# Patient Record
Sex: MALE | Race: BLACK OR AFRICAN AMERICAN | NOT HISPANIC OR LATINO | Employment: FULL TIME | URBAN - METROPOLITAN AREA
[De-identification: names, ages, dates, MRNs, and addresses within clinical notes are randomized per-mention and may not be internally consistent; named-entity substitution may affect disease eponyms.]

---

## 2021-01-19 ENCOUNTER — HOSPITAL ENCOUNTER (EMERGENCY)
Facility: HOSPITAL | Age: 56
Discharge: HOME | End: 2021-01-19
Attending: EMERGENCY MEDICINE
Payer: MEDICAID

## 2021-01-19 VITALS
DIASTOLIC BLOOD PRESSURE: 67 MMHG | OXYGEN SATURATION: 98 % | TEMPERATURE: 99 F | HEIGHT: 71 IN | SYSTOLIC BLOOD PRESSURE: 135 MMHG | BODY MASS INDEX: 33.6 KG/M2 | RESPIRATION RATE: 16 BRPM | WEIGHT: 240 LBS | HEART RATE: 71 BPM

## 2021-01-19 DIAGNOSIS — M25.461 KNEE EFFUSION, RIGHT: ICD-10-CM

## 2021-01-19 DIAGNOSIS — M25.561 RIGHT KNEE PAIN, UNSPECIFIED CHRONICITY: Primary | ICD-10-CM

## 2021-01-19 LAB
ALBUMIN SERPL-MCNC: 3.5 G/DL (ref 3.3–5.5)
ALP SERPL-CCNC: 83 U/L (ref 42–141)
APPEARANCE FLD: NORMAL
BILIRUB SERPL-MCNC: 0.9 MG/DL (ref 0.2–1.6)
BODY FLD TYPE: NORMAL
BUN SERPL-MCNC: 17 MG/DL (ref 7–22)
CALCIUM SERPL-MCNC: 9.4 MG/DL (ref 8–10.3)
CHLORIDE SERPL-SCNC: 106 MMOL/L (ref 98–108)
COLOR FLD: YELLOW
CREAT SERPL-MCNC: 1.3 MG/DL (ref 0.6–1.2)
GLUCOSE SERPL-MCNC: 107 MG/DL (ref 73–118)
LYMPHOCYTES NFR FLD MANUAL: 88 %
NEUTROPHILS NFR FLD MANUAL: 12 %
POC ALT (SGPT): 14 U/L (ref 10–47)
POC AST (SGOT): 20 U/L (ref 11–38)
POC PTINR: 1.2 (ref 0.9–1.2)
POC PTWBT: 13.9 SEC (ref 9.7–14.3)
POC TCO2: 29 MMOL/L (ref 18–33)
POTASSIUM BLD-SCNC: 4.7 MMOL/L (ref 3.6–5.1)
PROTEIN, POC: 7.4 G/DL (ref 6.4–8.1)
SAMPLE: NORMAL
SODIUM BLD-SCNC: 142 MMOL/L (ref 128–145)
WBC # FLD: 725 /CU MM

## 2021-01-19 PROCEDURE — 87205 SMEAR GRAM STAIN: CPT

## 2021-01-19 PROCEDURE — 25000003 PHARM REV CODE 250: Mod: ER | Performed by: EMERGENCY MEDICINE

## 2021-01-19 PROCEDURE — 99284 EMERGENCY DEPT VISIT MOD MDM: CPT | Mod: 25,ER

## 2021-01-19 PROCEDURE — 89051 BODY FLUID CELL COUNT: CPT

## 2021-01-19 PROCEDURE — 89060 EXAM SYNOVIAL FLUID CRYSTALS: CPT

## 2021-01-19 PROCEDURE — 80053 COMPREHEN METABOLIC PANEL: CPT | Mod: ER

## 2021-01-19 PROCEDURE — 85025 COMPLETE CBC W/AUTO DIFF WBC: CPT | Mod: ER

## 2021-01-19 PROCEDURE — 85610 PROTHROMBIN TIME: CPT | Mod: ER

## 2021-01-19 PROCEDURE — 87070 CULTURE OTHR SPECIMN AEROBIC: CPT

## 2021-01-19 PROCEDURE — 20610 DRAIN/INJ JOINT/BURSA W/O US: CPT | Mod: RT,ER

## 2021-01-19 RX ORDER — OXYCODONE AND ACETAMINOPHEN 5; 325 MG/1; MG/1
2 TABLET ORAL
Status: COMPLETED | OUTPATIENT
Start: 2021-01-19 | End: 2021-01-19

## 2021-01-19 RX ORDER — NAPROXEN 500 MG/1
500 TABLET ORAL 2 TIMES DAILY WITH MEALS
Qty: 30 TABLET | Refills: 0 | Status: SHIPPED | OUTPATIENT
Start: 2021-01-19 | End: 2021-01-24

## 2021-01-19 RX ORDER — LIDOCAINE HYDROCHLORIDE 10 MG/ML
2 INJECTION INFILTRATION; PERINEURAL
Status: COMPLETED | OUTPATIENT
Start: 2021-01-19 | End: 2021-01-19

## 2021-01-19 RX ORDER — OXYCODONE AND ACETAMINOPHEN 5; 325 MG/1; MG/1
1 TABLET ORAL EVERY 4 HOURS PRN
Qty: 18 TABLET | Refills: 0 | Status: SHIPPED | OUTPATIENT
Start: 2021-01-19 | End: 2021-02-24

## 2021-01-19 RX ADMIN — OXYCODONE HYDROCHLORIDE AND ACETAMINOPHEN 2 TABLET: 5; 325 TABLET ORAL at 01:01

## 2021-01-19 RX ADMIN — LIDOCAINE HYDROCHLORIDE 2 ML: 10 INJECTION, SOLUTION INFILTRATION; PERINEURAL at 12:01

## 2021-01-20 LAB
BODY FLD TYPE: NORMAL
CRYSTALS FLD MICRO: NEGATIVE
GRAM STN SPEC: NORMAL
GRAM STN SPEC: NORMAL

## 2021-01-23 LAB — BACTERIA FLD AEROBE CULT: NO GROWTH

## 2021-01-27 ENCOUNTER — HOSPITAL ENCOUNTER (EMERGENCY)
Facility: HOSPITAL | Age: 56
Discharge: HOME | End: 2021-01-27
Attending: EMERGENCY MEDICINE
Payer: MEDICAID

## 2021-01-27 VITALS
BODY MASS INDEX: 31.38 KG/M2 | RESPIRATION RATE: 18 BRPM | DIASTOLIC BLOOD PRESSURE: 71 MMHG | SYSTOLIC BLOOD PRESSURE: 134 MMHG | HEART RATE: 87 BPM | OXYGEN SATURATION: 97 % | TEMPERATURE: 99 F | WEIGHT: 225 LBS

## 2021-01-27 DIAGNOSIS — Z20.822 SUSPECTED COVID-19 VIRUS INFECTION: ICD-10-CM

## 2021-01-27 DIAGNOSIS — J30.9 ALLERGIC RHINITIS, UNSPECIFIED SEASONALITY, UNSPECIFIED TRIGGER: Primary | ICD-10-CM

## 2021-01-27 PROCEDURE — 99284 EMERGENCY DEPT VISIT MOD MDM: CPT | Mod: ER

## 2021-01-27 PROCEDURE — U0003 INFECTIOUS AGENT DETECTION BY NUCLEIC ACID (DNA OR RNA); SEVERE ACUTE RESPIRATORY SYNDROME CORONAVIRUS 2 (SARS-COV-2) (CORONAVIRUS DISEASE [COVID-19]), AMPLIFIED PROBE TECHNIQUE, MAKING USE OF HIGH THROUGHPUT TECHNOLOGIES AS DESCRIBED BY CMS-2020-01-R: HCPCS

## 2021-01-27 RX ORDER — PREDNISONE 20 MG/1
40 TABLET ORAL DAILY
Qty: 10 TABLET | Refills: 0 | Status: SHIPPED | OUTPATIENT
Start: 2021-01-27 | End: 2021-02-01

## 2021-01-27 RX ORDER — CETIRIZINE HYDROCHLORIDE 10 MG/1
10 TABLET ORAL DAILY PRN
Qty: 30 TABLET | Refills: 0 | Status: SHIPPED | OUTPATIENT
Start: 2021-01-27 | End: 2021-02-24

## 2021-01-29 LAB — SARS-COV-2 RNA RESP QL NAA+PROBE: NOT DETECTED

## 2021-02-24 ENCOUNTER — OFFICE VISIT (OUTPATIENT)
Dept: PODIATRY | Facility: CLINIC | Age: 56
End: 2021-02-24
Payer: MEDICAID

## 2021-02-24 ENCOUNTER — OFFICE VISIT (OUTPATIENT)
Dept: ORTHOPEDICS | Facility: CLINIC | Age: 56
End: 2021-02-24
Payer: MEDICAID

## 2021-02-24 VITALS
HEIGHT: 71 IN | HEART RATE: 74 BPM | BODY MASS INDEX: 30.57 KG/M2 | SYSTOLIC BLOOD PRESSURE: 118 MMHG | DIASTOLIC BLOOD PRESSURE: 78 MMHG | WEIGHT: 218.38 LBS | RESPIRATION RATE: 16 BRPM

## 2021-02-24 VITALS
BODY MASS INDEX: 29.99 KG/M2 | WEIGHT: 215 LBS | DIASTOLIC BLOOD PRESSURE: 84 MMHG | RESPIRATION RATE: 20 BRPM | SYSTOLIC BLOOD PRESSURE: 120 MMHG | HEART RATE: 72 BPM

## 2021-02-24 DIAGNOSIS — M17.11 PRIMARY OSTEOARTHRITIS OF RIGHT KNEE: Primary | ICD-10-CM

## 2021-02-24 DIAGNOSIS — M20.41 HAMMER TOES OF BOTH FEET: ICD-10-CM

## 2021-02-24 DIAGNOSIS — M20.42 HAMMER TOES OF BOTH FEET: ICD-10-CM

## 2021-02-24 DIAGNOSIS — L84 CORN OR CALLUS: Primary | ICD-10-CM

## 2021-02-24 DIAGNOSIS — M79.671 FOOT PAIN, RIGHT: ICD-10-CM

## 2021-02-24 PROCEDURE — 99203 OFFICE O/P NEW LOW 30 MIN: CPT | Mod: S$PBB,,, | Performed by: PODIATRIST

## 2021-02-24 PROCEDURE — 99999 PR PBB SHADOW E&M-EST. PATIENT-LVL III: ICD-10-PCS | Mod: PBBFAC,,, | Performed by: PODIATRIST

## 2021-02-24 PROCEDURE — 99203 PR OFFICE/OUTPT VISIT, NEW, LEVL III, 30-44 MIN: ICD-10-PCS | Mod: S$PBB,,, | Performed by: PODIATRIST

## 2021-02-24 PROCEDURE — 99213 OFFICE O/P EST LOW 20 MIN: CPT | Mod: PBBFAC,PN | Performed by: PHYSICIAN ASSISTANT

## 2021-02-24 PROCEDURE — 99999 PR PBB SHADOW E&M-EST. PATIENT-LVL III: ICD-10-PCS | Mod: PBBFAC,,, | Performed by: PHYSICIAN ASSISTANT

## 2021-02-24 PROCEDURE — 99999 PR PBB SHADOW E&M-EST. PATIENT-LVL III: CPT | Mod: PBBFAC,,, | Performed by: PHYSICIAN ASSISTANT

## 2021-02-24 PROCEDURE — 99203 OFFICE O/P NEW LOW 30 MIN: CPT | Mod: S$PBB,,, | Performed by: PHYSICIAN ASSISTANT

## 2021-02-24 PROCEDURE — 99999 PR PBB SHADOW E&M-EST. PATIENT-LVL III: CPT | Mod: PBBFAC,,, | Performed by: PODIATRIST

## 2021-02-24 PROCEDURE — 99213 OFFICE O/P EST LOW 20 MIN: CPT | Mod: PBBFAC,27,PN | Performed by: PODIATRIST

## 2021-02-24 PROCEDURE — 99203 PR OFFICE/OUTPT VISIT, NEW, LEVL III, 30-44 MIN: ICD-10-PCS | Mod: S$PBB,,, | Performed by: PHYSICIAN ASSISTANT

## 2021-02-24 RX ORDER — AMLODIPINE BESYLATE 5 MG/1
5 TABLET ORAL
COMMUNITY
End: 2022-05-31

## 2021-02-24 RX ORDER — NAPROXEN 500 MG/1
500 TABLET ORAL
COMMUNITY
End: 2021-04-08

## 2021-02-24 RX ORDER — ALBUTEROL SULFATE 90 UG/1
AEROSOL, METERED RESPIRATORY (INHALATION)
COMMUNITY
Start: 2021-02-19

## 2021-02-24 RX ORDER — AMLODIPINE BESYLATE 10 MG/1
TABLET ORAL
COMMUNITY
Start: 2021-02-22

## 2021-02-24 RX ORDER — TRIAMCINOLONE ACETONIDE 40 MG/ML
40 INJECTION, SUSPENSION INTRA-ARTICULAR; INTRAMUSCULAR
Status: DISCONTINUED | OUTPATIENT
Start: 2021-02-24 | End: 2021-02-24 | Stop reason: HOSPADM

## 2021-02-24 RX ADMIN — TRIAMCINOLONE ACETONIDE 40 MG: 40 INJECTION, SUSPENSION INTRA-ARTICULAR; INTRAMUSCULAR at 07:02

## 2021-03-31 ENCOUNTER — IMMUNIZATION (OUTPATIENT)
Dept: OBSTETRICS AND GYNECOLOGY | Facility: CLINIC | Age: 56
End: 2021-03-31
Payer: MEDICAID

## 2021-03-31 DIAGNOSIS — Z23 NEED FOR VACCINATION: Primary | ICD-10-CM

## 2021-03-31 PROCEDURE — 91300 COVID-19, MRNA, LNP-S, PF, 30 MCG/0.3 ML DOSE VACCINE: CPT | Mod: PBBFAC | Performed by: FAMILY MEDICINE

## 2021-04-08 ENCOUNTER — OFFICE VISIT (OUTPATIENT)
Dept: ORTHOPEDICS | Facility: CLINIC | Age: 56
End: 2021-04-08
Payer: MEDICAID

## 2021-04-08 ENCOUNTER — TELEPHONE (OUTPATIENT)
Dept: ORTHOPEDICS | Facility: CLINIC | Age: 56
End: 2021-04-08

## 2021-04-08 VITALS
DIASTOLIC BLOOD PRESSURE: 86 MMHG | SYSTOLIC BLOOD PRESSURE: 162 MMHG | WEIGHT: 216 LBS | RESPIRATION RATE: 20 BRPM | BODY MASS INDEX: 30.13 KG/M2 | HEART RATE: 90 BPM

## 2021-04-08 DIAGNOSIS — M17.11 PRIMARY OSTEOARTHRITIS OF RIGHT KNEE: Primary | ICD-10-CM

## 2021-04-08 PROCEDURE — 99213 OFFICE O/P EST LOW 20 MIN: CPT | Mod: S$PBB,,, | Performed by: PHYSICIAN ASSISTANT

## 2021-04-08 PROCEDURE — 99213 OFFICE O/P EST LOW 20 MIN: CPT | Mod: PBBFAC,PN | Performed by: PHYSICIAN ASSISTANT

## 2021-04-08 PROCEDURE — 99999 PR PBB SHADOW E&M-EST. PATIENT-LVL III: ICD-10-PCS | Mod: PBBFAC,,, | Performed by: PHYSICIAN ASSISTANT

## 2021-04-08 PROCEDURE — 99999 PR PBB SHADOW E&M-EST. PATIENT-LVL III: CPT | Mod: PBBFAC,,, | Performed by: PHYSICIAN ASSISTANT

## 2021-04-08 PROCEDURE — 99213 PR OFFICE/OUTPT VISIT, EST, LEVL III, 20-29 MIN: ICD-10-PCS | Mod: S$PBB,,, | Performed by: PHYSICIAN ASSISTANT

## 2021-04-08 RX ORDER — CYCLOBENZAPRINE HCL 10 MG
10 TABLET ORAL 3 TIMES DAILY PRN
Qty: 30 TABLET | Refills: 0 | Status: SHIPPED | OUTPATIENT
Start: 2021-04-08 | End: 2021-04-18

## 2021-04-08 RX ORDER — MELOXICAM 15 MG/1
15 TABLET ORAL DAILY
Qty: 30 TABLET | Refills: 2 | OUTPATIENT
Start: 2021-04-08 | End: 2022-05-31

## 2021-04-21 ENCOUNTER — IMMUNIZATION (OUTPATIENT)
Dept: OBSTETRICS AND GYNECOLOGY | Facility: CLINIC | Age: 56
End: 2021-04-21
Payer: MEDICAID

## 2021-04-21 DIAGNOSIS — Z23 NEED FOR VACCINATION: Primary | ICD-10-CM

## 2021-04-21 PROCEDURE — 0002A COVID-19, MRNA, LNP-S, PF, 30 MCG/0.3 ML DOSE VACCINE: CPT | Mod: CV19,,, | Performed by: FAMILY MEDICINE

## 2021-04-21 PROCEDURE — 91300 COVID-19, MRNA, LNP-S, PF, 30 MCG/0.3 ML DOSE VACCINE: ICD-10-PCS | Mod: ,,, | Performed by: FAMILY MEDICINE

## 2021-04-21 PROCEDURE — 0002A COVID-19, MRNA, LNP-S, PF, 30 MCG/0.3 ML DOSE VACCINE: ICD-10-PCS | Mod: CV19,,, | Performed by: FAMILY MEDICINE

## 2021-04-21 PROCEDURE — 91300 COVID-19, MRNA, LNP-S, PF, 30 MCG/0.3 ML DOSE VACCINE: CPT | Mod: ,,, | Performed by: FAMILY MEDICINE

## 2021-08-11 ENCOUNTER — HOSPITAL ENCOUNTER (EMERGENCY)
Facility: HOSPITAL | Age: 56
Discharge: HOME | End: 2021-08-11
Attending: EMERGENCY MEDICINE
Payer: MEDICAID

## 2021-08-11 VITALS
HEIGHT: 71 IN | BODY MASS INDEX: 30.8 KG/M2 | RESPIRATION RATE: 16 BRPM | WEIGHT: 220 LBS | DIASTOLIC BLOOD PRESSURE: 70 MMHG | TEMPERATURE: 98 F | HEART RATE: 68 BPM | SYSTOLIC BLOOD PRESSURE: 147 MMHG | OXYGEN SATURATION: 100 %

## 2021-08-11 DIAGNOSIS — J03.90 TONSILLITIS: Primary | ICD-10-CM

## 2021-08-11 LAB
CTP QC/QA: YES
POC RAPID STREP A: NEGATIVE
SARS-COV-2 RDRP RESP QL NAA+PROBE: NEGATIVE

## 2021-08-11 PROCEDURE — U0002 COVID-19 LAB TEST NON-CDC: HCPCS | Mod: ER | Performed by: EMERGENCY MEDICINE

## 2021-08-11 PROCEDURE — 99283 EMERGENCY DEPT VISIT LOW MDM: CPT | Mod: ER

## 2021-08-11 RX ORDER — AZITHROMYCIN 250 MG/1
TABLET, FILM COATED ORAL
Qty: 6 TABLET | Refills: 0 | Status: SHIPPED | OUTPATIENT
Start: 2021-08-11 | End: 2022-03-09

## 2022-03-08 ENCOUNTER — TELEPHONE (OUTPATIENT)
Dept: ORTHOPEDICS | Facility: CLINIC | Age: 57
End: 2022-03-08
Payer: MEDICAID

## 2022-03-08 DIAGNOSIS — M25.569 KNEE PAIN, UNSPECIFIED CHRONICITY, UNSPECIFIED LATERALITY: Primary | ICD-10-CM

## 2022-03-08 NOTE — TELEPHONE ENCOUNTER
----- Message from Cadence Leonard PA-C sent at 3/8/2022  7:24 AM CST -----  He has appt tomorrow and needs bilateral knee XRs prior to seeing me.     Thanks.

## 2022-03-08 NOTE — PROGRESS NOTES
Subjective:      Patient ID: New Petit is a 56 y.o. male.    Chief Complaint: Pain of the Left Lower Leg and Pain of the Right Lower Leg      HPI  (Cindy)    Last seen by me on 21 for right knee pain with known mild medial joint space narrowing in both knees.     He was given right knee injection (21) and knee brace with no improvement. Right knee pain was likely due to bone marrow edema.     He was given mobic and flexeril at last visit. Was to get guardian  brace for right knee- he never got this.     He is here for follow up.     He continues with constant bilateral knee pain, right > left, that is worse with standing and at night/with driving. He has locking in right knee with giving way. He rates his pain as an 8 on a scale of 1-10. No alleviating factors. Pain is aching in nature.     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     He is taking mobic with no relief. He just started voltaren gel from PCP. He was scheduled to see PT but was told he needed a referral. No surgery on his knees.      History reviewed. No pertinent past medical history.      Current Outpatient Medications:     albuterol (PROVENTIL/VENTOLIN HFA) 90 mcg/actuation inhaler, INHALE 1 PUFF INTO THE LUNGS EVERY 6 HOURS AS NEEDED FOR SHORTNESS OF BREATH, Disp: , Rfl:     amLODIPine (NORVASC) 10 MG tablet, , Disp: , Rfl:     amLODIPine (NORVASC) 5 MG tablet, Take 5 mg by mouth., Disp: , Rfl:     butalbital-acetaminophen-caffeine -40 mg (FIORICET, ESGIC) -40 mg per tablet, Take 1 tablet by mouth daily as needed., Disp: , Rfl:     cetirizine (ZYRTEC) 10 MG tablet, Take 10 mg by mouth once daily., Disp: , Rfl:     diclofenac sodium (VOLTAREN) 1 % Gel, SMARTSI Gram(s) Topical Twice Daily PRN, Disp: , Rfl:     fluticasone propionate (FLONASE) 50 mcg/actuation nasal spray, SPRAY ONCE IN EACH NOSTRIL ONCE DAILY AS NEEDED FOR  "CONGESTION, Disp: , Rfl:     meloxicam (MOBIC) 15 MG tablet, Take 1 tablet (15 mg total) by mouth once daily. Take with food., Disp: 30 tablet, Rfl: 2    pantoprazole (PROTONIX) 40 MG tablet, Take 40 mg by mouth once daily., Disp: , Rfl:     Review of patient's allergies indicates:  No Known Allergies    Review of Systems   Constitutional: Negative for chills, fever, night sweats and weight gain.   Gastrointestinal: Negative for bowel incontinence, nausea and vomiting.   Genitourinary: Negative for bladder incontinence.   Neurological: Negative for disturbances in coordination and loss of balance.         Objective:        /74   Pulse 78   Ht 5' 11" (1.803 m)   Wt 108 kg (238 lb)   SpO2 97%   BMI 33.19 kg/m²     Ortho/SPM Exam     Body habitus is normal.   The patient walks with a limp.      RIGHT KNEE EXAM:  Resisted SLR negative.   The skin over the knee is intact.  Knee effusion not significant   Tendernes is located medial   Range of motion- Flexion 100 deg, Extension 0 deg,     Ligament exam:   MCL intact   Lachman intact              Post sag intact    LCL intact    Patellar apprehension negative.  Popliteal cyst negative  Patellar crepitation present.  Flexion/pinch negative    Motor normal 5/5 strength in all tested muscle groups.   Sensory normal.    LEFT KNEE EXAM:  Resisted SLR negative.   The skin over the knee is intact.  Knee effusion none   Tendernes is located medial  Range of motion- Flexion 110 deg, Extension 0 deg,      Ligament exam:              MCL intact              Lachman intact              Post sag intact              LCL intact     Patellar apprehension negative.  Popliteal cyst negative  Patellar crepitation present.  Flexion/pinch negative.     Motor normal 5/5 strength in all tested muscle groups.   Sensory normal.      XRAY INTERPRETATION:  X-rays of bilateral knees dated 3/9/22 are personally reviewed and show mild medial joint space narrowing both knees.       "   Assessment:       Encounter Diagnoses   Name Primary?    Primary osteoarthritis of right knee     Primary osteoarthritis of left knee Yes          Plan:       New was seen today for pain and pain.    Diagnoses and all orders for this visit:    Primary osteoarthritis of left knee  -     Ambulatory referral/consult to Physical/Occupational Therapy; Future    Primary osteoarthritis of right knee  -     Ambulatory referral/consult to Orthopedics  -     Ambulatory referral/consult to Physical/Occupational Therapy; Future  -     KNEE BRACE FOR HOME USE      He continues with constant bilateral knee pain, right > left, that is worse with standing and at night/with driving. He has locking in right knee with giving way.     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     Known mild medial joint space narrowing both knees.     Treatment options reviewed with patient along with above bilateral knee xrays. Following plan made:      - PT orders for both knees sent to [facility].   - Continue voltaren gel from PCP. Stop mobic as it was not helping.    - No relief with off the shelf hinged knee brace. Order to [facility] for guardian  brace for right knee.   - No relief with last knee injection. Will hold on further injections for now. May need to revisit if no improvement with above.    Follow up in about 3 months (around 6/9/2022).

## 2022-03-09 ENCOUNTER — OFFICE VISIT (OUTPATIENT)
Dept: ORTHOPEDICS | Facility: CLINIC | Age: 57
End: 2022-03-09
Payer: MEDICAID

## 2022-03-09 VITALS
SYSTOLIC BLOOD PRESSURE: 132 MMHG | WEIGHT: 238 LBS | HEIGHT: 71 IN | BODY MASS INDEX: 33.32 KG/M2 | DIASTOLIC BLOOD PRESSURE: 74 MMHG | OXYGEN SATURATION: 97 % | HEART RATE: 78 BPM

## 2022-03-09 DIAGNOSIS — M17.12 PRIMARY OSTEOARTHRITIS OF LEFT KNEE: Primary | ICD-10-CM

## 2022-03-09 DIAGNOSIS — M17.11 PRIMARY OSTEOARTHRITIS OF RIGHT KNEE: ICD-10-CM

## 2022-03-09 PROCEDURE — 3008F PR BODY MASS INDEX (BMI) DOCUMENTED: ICD-10-PCS | Mod: CPTII,,, | Performed by: PHYSICIAN ASSISTANT

## 2022-03-09 PROCEDURE — 3078F DIAST BP <80 MM HG: CPT | Mod: CPTII,,, | Performed by: PHYSICIAN ASSISTANT

## 2022-03-09 PROCEDURE — 3075F SYST BP GE 130 - 139MM HG: CPT | Mod: CPTII,,, | Performed by: PHYSICIAN ASSISTANT

## 2022-03-09 PROCEDURE — 4010F PR ACE/ARB THEARPY RXD/TAKEN: ICD-10-PCS | Mod: CPTII,,, | Performed by: PHYSICIAN ASSISTANT

## 2022-03-09 PROCEDURE — 1160F PR REVIEW ALL MEDS BY PRESCRIBER/CLIN PHARMACIST DOCUMENTED: ICD-10-PCS | Mod: CPTII,,, | Performed by: PHYSICIAN ASSISTANT

## 2022-03-09 PROCEDURE — 3008F BODY MASS INDEX DOCD: CPT | Mod: CPTII,,, | Performed by: PHYSICIAN ASSISTANT

## 2022-03-09 PROCEDURE — 99214 PR OFFICE/OUTPT VISIT, EST, LEVL IV, 30-39 MIN: ICD-10-PCS | Mod: S$PBB,,, | Performed by: PHYSICIAN ASSISTANT

## 2022-03-09 PROCEDURE — 4010F ACE/ARB THERAPY RXD/TAKEN: CPT | Mod: CPTII,,, | Performed by: PHYSICIAN ASSISTANT

## 2022-03-09 PROCEDURE — 1159F PR MEDICATION LIST DOCUMENTED IN MEDICAL RECORD: ICD-10-PCS | Mod: CPTII,,, | Performed by: PHYSICIAN ASSISTANT

## 2022-03-09 PROCEDURE — 3075F PR MOST RECENT SYSTOLIC BLOOD PRESS GE 130-139MM HG: ICD-10-PCS | Mod: CPTII,,, | Performed by: PHYSICIAN ASSISTANT

## 2022-03-09 PROCEDURE — 99999 PR PBB SHADOW E&M-EST. PATIENT-LVL V: ICD-10-PCS | Mod: PBBFAC,,, | Performed by: PHYSICIAN ASSISTANT

## 2022-03-09 PROCEDURE — 3078F PR MOST RECENT DIASTOLIC BLOOD PRESSURE < 80 MM HG: ICD-10-PCS | Mod: CPTII,,, | Performed by: PHYSICIAN ASSISTANT

## 2022-03-09 PROCEDURE — 99215 OFFICE O/P EST HI 40 MIN: CPT | Mod: PBBFAC,PN | Performed by: PHYSICIAN ASSISTANT

## 2022-03-09 PROCEDURE — 99214 OFFICE O/P EST MOD 30 MIN: CPT | Mod: S$PBB,,, | Performed by: PHYSICIAN ASSISTANT

## 2022-03-09 PROCEDURE — 99999 PR PBB SHADOW E&M-EST. PATIENT-LVL V: CPT | Mod: PBBFAC,,, | Performed by: PHYSICIAN ASSISTANT

## 2022-03-09 PROCEDURE — 1159F MED LIST DOCD IN RCRD: CPT | Mod: CPTII,,, | Performed by: PHYSICIAN ASSISTANT

## 2022-03-09 PROCEDURE — 1160F RVW MEDS BY RX/DR IN RCRD: CPT | Mod: CPTII,,, | Performed by: PHYSICIAN ASSISTANT

## 2022-03-09 RX ORDER — CETIRIZINE HYDROCHLORIDE 10 MG/1
10 TABLET ORAL DAILY
COMMUNITY
Start: 2022-03-08 | End: 2022-05-31

## 2022-03-09 RX ORDER — BUTALBITAL, ACETAMINOPHEN AND CAFFEINE 50; 325; 40 MG/1; MG/1; MG/1
1 TABLET ORAL DAILY PRN
COMMUNITY
Start: 2022-02-22 | End: 2022-05-31

## 2022-03-09 RX ORDER — FLUTICASONE PROPIONATE 50 MCG
SPRAY, SUSPENSION (ML) NASAL
COMMUNITY
Start: 2022-02-07 | End: 2022-05-31

## 2022-03-09 RX ORDER — PANTOPRAZOLE SODIUM 40 MG/1
40 TABLET, DELAYED RELEASE ORAL DAILY
COMMUNITY
Start: 2021-12-01

## 2022-03-09 RX ORDER — DICLOFENAC SODIUM 10 MG/G
GEL TOPICAL
COMMUNITY
Start: 2022-02-22 | End: 2022-05-31

## 2022-03-09 NOTE — PATIENT INSTRUCTIONS
It was nice to see you again today! I am sorry that you are hurting so much.     You have some wear and tear in your knees (arthritis) and this is likely what is causing your pain.     I put an order in for a custom knee brace for the right knee. Lizzette should be calling you from [facility]. If you don't hear from her, you can call [phone].     Use the diclofenac gel as directed by your PCP. Stop the meloxicam.     I sent physical therapy orders to [facility] . They should call you to set up, but if not you can call [phone].     We sent a message to Dr. Cohen. They should call you with an appointment for your foot.     I will see you back in 3 months, but please stay in touch and call me if you need anything. You can also send me a message in [facility].     Cadence   [phone]

## 2022-05-31 ENCOUNTER — HOSPITAL ENCOUNTER (EMERGENCY)
Facility: HOSPITAL | Age: 57
Discharge: HOME | End: 2022-05-31
Attending: EMERGENCY MEDICINE
Payer: MEDICAID

## 2022-05-31 VITALS
BODY MASS INDEX: 32.2 KG/M2 | WEIGHT: 230 LBS | HEART RATE: 74 BPM | DIASTOLIC BLOOD PRESSURE: 64 MMHG | TEMPERATURE: 99 F | OXYGEN SATURATION: 97 % | HEIGHT: 71 IN | RESPIRATION RATE: 21 BRPM | SYSTOLIC BLOOD PRESSURE: 122 MMHG

## 2022-05-31 DIAGNOSIS — J30.9 ALLERGIC RHINITIS, UNSPECIFIED SEASONALITY, UNSPECIFIED TRIGGER: ICD-10-CM

## 2022-05-31 DIAGNOSIS — J10.1 INFLUENZA A: Primary | ICD-10-CM

## 2022-05-31 LAB
INFLUENZA A ANTIGEN, POC: POSITIVE
INFLUENZA B ANTIGEN, POC: NEGATIVE
POC RAPID STREP A: NEGATIVE

## 2022-05-31 PROCEDURE — 87502 INFLUENZA DNA AMP PROBE: CPT | Mod: ER

## 2022-05-31 PROCEDURE — 25000003 PHARM REV CODE 250: Mod: ER | Performed by: NURSE PRACTITIONER

## 2022-05-31 PROCEDURE — U0003 INFECTIOUS AGENT DETECTION BY NUCLEIC ACID (DNA OR RNA); SEVERE ACUTE RESPIRATORY SYNDROME CORONAVIRUS 2 (SARS-COV-2) (CORONAVIRUS DISEASE [COVID-19]), AMPLIFIED PROBE TECHNIQUE, MAKING USE OF HIGH THROUGHPUT TECHNOLOGIES AS DESCRIBED BY CMS-2020-01-R: HCPCS | Performed by: NURSE PRACTITIONER

## 2022-05-31 PROCEDURE — 25000003 PHARM REV CODE 250: Mod: ER | Performed by: EMERGENCY MEDICINE

## 2022-05-31 PROCEDURE — 99284 EMERGENCY DEPT VISIT MOD MDM: CPT | Mod: 25,ER

## 2022-05-31 PROCEDURE — U0005 INFEC AGEN DETEC AMPLI PROBE: HCPCS | Performed by: NURSE PRACTITIONER

## 2022-05-31 RX ORDER — OSELTAMIVIR PHOSPHATE 75 MG/1
75 CAPSULE ORAL 2 TIMES DAILY
Qty: 10 CAPSULE | Refills: 0 | Status: SHIPPED | OUTPATIENT
Start: 2022-05-31 | End: 2022-06-05

## 2022-05-31 RX ORDER — IBUPROFEN 600 MG/1
600 TABLET ORAL EVERY 6 HOURS PRN
Qty: 20 TABLET | Refills: 0 | Status: SHIPPED | OUTPATIENT
Start: 2022-05-31 | End: 2022-06-05

## 2022-05-31 RX ORDER — METHOCARBAMOL 750 MG/1
750 TABLET, FILM COATED ORAL 2 TIMES DAILY PRN
COMMUNITY
Start: 2022-05-09 | End: 2023-05-12

## 2022-05-31 RX ORDER — FLUTICASONE PROPIONATE 50 MCG
1 SPRAY, SUSPENSION (ML) NASAL 2 TIMES DAILY PRN
Qty: 15 G | Refills: 0 | Status: SHIPPED | OUTPATIENT
Start: 2022-05-31 | End: 2022-06-14

## 2022-05-31 RX ORDER — UMECLIDINIUM BROMIDE AND VILANTEROL TRIFENATATE 62.5; 25 UG/1; UG/1
1 POWDER RESPIRATORY (INHALATION) DAILY
COMMUNITY
Start: 2022-03-09

## 2022-05-31 RX ORDER — GUAIFENESIN/DEXTROMETHORPHAN 100-10MG/5
5 SYRUP ORAL 4 TIMES DAILY PRN
Qty: 240 ML | Refills: 0 | Status: SHIPPED | OUTPATIENT
Start: 2022-05-31 | End: 2022-06-10

## 2022-05-31 RX ORDER — ACETAMINOPHEN 500 MG
1000 TABLET ORAL
Status: COMPLETED | OUTPATIENT
Start: 2022-05-31 | End: 2022-05-31

## 2022-05-31 RX ORDER — LORATADINE 10 MG/1
10 TABLET ORAL DAILY
Qty: 30 TABLET | Refills: 0 | Status: SHIPPED | OUTPATIENT
Start: 2022-05-31 | End: 2022-06-30

## 2022-05-31 RX ORDER — LOSARTAN POTASSIUM 50 MG/1
50 TABLET ORAL DAILY
COMMUNITY
Start: 2022-05-09

## 2022-05-31 RX ORDER — LIDOCAINE 50 MG/G
PATCH TOPICAL
COMMUNITY
Start: 2022-05-09

## 2022-05-31 RX ORDER — DEXTROMETHORPHAN HYDROBROMIDE, GUAIFENESIN 5; 100 MG/5ML; MG/5ML
650 LIQUID ORAL EVERY 8 HOURS
Qty: 20 TABLET | Refills: 0 | Status: SHIPPED | OUTPATIENT
Start: 2022-05-31 | End: 2022-06-05

## 2022-05-31 RX ORDER — IBUPROFEN 600 MG/1
600 TABLET ORAL
Status: COMPLETED | OUTPATIENT
Start: 2022-05-31 | End: 2022-05-31

## 2022-05-31 RX ADMIN — ACETAMINOPHEN 1000 MG: 500 TABLET ORAL at 09:05

## 2022-05-31 RX ADMIN — IBUPROFEN 600 MG: 600 TABLET ORAL at 06:05

## 2022-05-31 NOTE — Clinical Note
"New Kim" Marisabel was seen and treated in our emergency department on 5/31/2022.  He may return to work on 06/07/2022.       If you have any questions or concerns, please don't hesitate to call.      ALEJA Ratliff"

## 2022-06-01 LAB
SARS-COV-2 RNA RESP QL NAA+PROBE: NOT DETECTED
SARS-COV-2- CYCLE NUMBER: NORMAL

## 2022-06-01 NOTE — ED PROVIDER NOTES
Encounter Date: 5/31/2022       History     Chief Complaint   Patient presents with    Cough    Generalized Body Aches     Onset this am, also sore throat     57 y/o male presents to the ED with cough, body aches, and sore throat that started this morning.  Patient denies rash, chest pain, SOB, numbness, weakness, tingling, abdominal pain, back pain, dysuria, hematuria, nausea, vomiting, diarrhea, or any other complaints.  Patient rates the pain as 10/10 and has not taken any medications for the symptoms.  No Alleviating/aggravating factors.  Has had COVID and flu vaccines.  No known sick exposure.               Review of patient's allergies indicates:  No Known Allergies  Past Medical History:   Diagnosis Date    Hypertension      History reviewed. No pertinent surgical history.  History reviewed. No pertinent family history.  Social History     Tobacco Use    Smoking status: Current Every Day Smoker     Packs/day: 0.25     Types: Cigarettes    Smokeless tobacco: Never Used     Review of Systems   Constitutional: Negative for chills and fever.   HENT: Positive for sore throat. Negative for congestion, ear pain and rhinorrhea.    Eyes: Negative for pain, discharge and redness.   Respiratory: Positive for cough. Negative for shortness of breath.    Cardiovascular: Negative for chest pain.   Gastrointestinal: Negative for abdominal pain, diarrhea, nausea and vomiting.   Genitourinary: Negative for dysuria.   Musculoskeletal: Positive for myalgias. Negative for back pain, neck pain and neck stiffness.   Skin: Negative for rash.   Neurological: Negative for dizziness, weakness, light-headedness, numbness and headaches.   Psychiatric/Behavioral: Negative for confusion.       Physical Exam     Initial Vitals [05/31/22 1842]   BP Pulse Resp Temp SpO2   (!) 149/59 91 (!) 21 (!) 101.8 °F (38.8 °C) 95 %      MAP       --         Physical Exam    Nursing note and vitals reviewed.  Constitutional: He appears  well-developed. He is cooperative.  Non-toxic appearance. He does not appear ill.   HENT:   Head: Normocephalic and atraumatic.   Right Ear: Tympanic membrane and ear canal normal.   Left Ear: Tympanic membrane and ear canal normal.   Nose: Mucosal edema present.   Mouth/Throat: Uvula is midline, oropharynx is clear and moist and mucous membranes are normal.   Eyes: Conjunctivae are normal.   Neck: No Brudzinski's sign and no Kernig's sign noted.   Normal range of motion.  Cardiovascular: Normal rate and regular rhythm.   Pulmonary/Chest: Effort normal and breath sounds normal.   Abdominal: Abdomen is soft. There is no abdominal tenderness.   Musculoskeletal:      Cervical back: Normal range of motion.     Lymphadenopathy:     He has no cervical adenopathy.   Neurological: He is alert and oriented to person, place, and time. GCS eye subscore is 4. GCS verbal subscore is 5. GCS motor subscore is 6.   Skin: Skin is warm, dry and intact. No rash noted.   Psychiatric: He has a normal mood and affect. His speech is normal and behavior is normal. Thought content normal.         ED Course   Procedures  Labs Reviewed   POCT RAPID INFLUENZA A/B - Abnormal; Notable for the following components:       Result Value    Inflenza A Ag positive (*)     All other components within normal limits   SARS-COV-2 (COVID-19) QUALITATIVE PCR   POCT STREP A MOLECULAR   POCT INFLUENZA A/B MOLECULAR   POCT STREP A, RAPID          Imaging Results          X-Ray Chest AP Portable (Final result)  Result time 05/31/22 21:04:31    Final result by Dilma Nunez MD (05/31/22 21:04:31)                 Impression:      No acute cardiopulmonary process identified.      Electronically signed by: Dilma Nunez MD  Date:    05/31/2022  Time:    21:04             Narrative:    EXAMINATION:  XR CHEST AP PORTABLE    CLINICAL HISTORY:  COVID-19;    TECHNIQUE:  Single frontal view of the chest was performed.    COMPARISON:  None    FINDINGS:  Cardiac  silhouette appears mildly enlarged, however noting magnification on this single AP portable view.  Lungs are symmetrically expanded.  No evidence of focal consolidative process, pneumothorax, or significant pleural effusion.  Small calcification is seen over the right hemidiaphragm.  No acute osseous abnormality identified.                                 Medications   ibuprofen tablet 600 mg (600 mg Oral Given 5/31/22 1848)   acetaminophen tablet 1,000 mg (1,000 mg Oral Given 5/31/22 2104)           APC / Resident Notes:   This is an evaluation of a 56 y.o. male that presents to the Emergency Department for Cough and Body Aches. The patient is a non-toxic, febrile, and well appearing male. On physical exam: Ears: without infection. Pharynx without infection. Appears well hydrated with moist mucus membranes. Neck soft and supple with no meningeal signs. Breath sounds are clear and equal bilaterally. No tachypnea or respiratory distress with room air pulse ox of 97% and no evidence of hypoxia or cyanosis.     Vital Signs Are Reassuring. RESULTS: Influenza: Positive.  COVID PCR pending, CXR negative, Strep negative     The patient is within the antiviral treatment window and has been offered treatment with [facility]fu. Risks/Benefits discussed. [facility] information handout will be on the discharge paperwork.     My overall impression is Influenza A, allergic rhinitis.  I considered, but at this time, do not suspect OM, OE, strep pharyngitis, meningitis, pneumonia, significant dehydration, bacterial sinusitis.    ED Course: D/C Med: Tamiflu, Claritin, Flonase, Robitussin DM. D/C Information: Supportive care, Tylenol/Ibuprofen PRN, Hydration. The diagnosis, treatment plan, instructions for follow-up and reevaluation with PCP as well as ED return precautions were discussed and understanding was verbalized. All questions or concerns have been addressed.                    Clinical Impression:   Final diagnoses:  [J10.1]  Influenza A (Primary)  [J30.9] Allergic rhinitis, unspecified seasonality, unspecified trigger          ED Disposition Condition    Discharge Stable        ED Prescriptions     Medication Sig Dispense Start Date End Date Auth. Provider    oseltamivir (TAMIFLU) 75 MG capsule Take 1 capsule (75 mg total) by mouth 2 (two) times daily. for 5 days 10 capsule 5/31/2022 6/5/2022 ALEJA Ratliff    acetaminophen (TYLENOL) 650 MG TbSR Take 1 tablet (650 mg total) by mouth every 8 (eight) hours. for 5 days 20 tablet 5/31/2022 6/5/2022 ALEJA Ratliff    ibuprofen (ADVIL,MOTRIN) 600 MG tablet Take 1 tablet (600 mg total) by mouth every 6 (six) hours as needed for Pain. 20 tablet 5/31/2022 6/5/2022 ALEJA Ratliff    dextromethorphan-guaiFENesin  mg/5 ml (ROBITUSSIN-DM)  mg/5 mL liquid Take 5 mLs by mouth 4 (four) times daily as needed (cough). 240 mL 5/31/2022 6/10/2022 ALEJA Ratliff    fluticasone propionate (FLONASE) 50 mcg/actuation nasal spray 1 spray (50 mcg total) by Each Nostril route 2 (two) times daily as needed for Rhinitis or Allergies. 15 g 5/31/2022 6/14/2022 ALEJA Ratliff    loratadine (CLARITIN) 10 mg tablet Take 1 tablet (10 mg total) by mouth once daily. 30 tablet 5/31/2022 6/30/2022 ALEJA Ratliff        Follow-up Information     Follow up With Specialties Details Why Contact Info    David Shirley MD Family Medicine Schedule an appointment as soon as possible for a visit in 2 days  [address] [facility] [address]  [phone]      [facility] ED Emergency Medicine Go to  If symptoms worsen [address] [facility] [address] [phone]           ALEJA Ratliff  05/31/22 2937

## 2023-05-12 ENCOUNTER — HOSPITAL ENCOUNTER (EMERGENCY)
Facility: HOSPITAL | Age: 58
Discharge: HOME | End: 2023-05-12
Attending: EMERGENCY MEDICINE
Payer: MEDICAID

## 2023-05-12 VITALS
OXYGEN SATURATION: 98 % | TEMPERATURE: 98 F | DIASTOLIC BLOOD PRESSURE: 79 MMHG | RESPIRATION RATE: 18 BRPM | HEART RATE: 76 BPM | SYSTOLIC BLOOD PRESSURE: 151 MMHG | WEIGHT: 238 LBS | BODY MASS INDEX: 33.19 KG/M2

## 2023-05-12 DIAGNOSIS — T14.8XXA CONTUSION OF SOFT TISSUE: ICD-10-CM

## 2023-05-12 DIAGNOSIS — T14.90XA INJURY: ICD-10-CM

## 2023-05-12 DIAGNOSIS — M25.572 ACUTE LEFT ANKLE PAIN: ICD-10-CM

## 2023-05-12 DIAGNOSIS — M25.561 ACUTE PAIN OF RIGHT KNEE: Primary | ICD-10-CM

## 2023-05-12 PROCEDURE — 96372 THER/PROPH/DIAG INJ SC/IM: CPT | Performed by: EMERGENCY MEDICINE

## 2023-05-12 PROCEDURE — 63600175 PHARM REV CODE 636 W HCPCS: Mod: ER | Performed by: EMERGENCY MEDICINE

## 2023-05-12 PROCEDURE — 99284 EMERGENCY DEPT VISIT MOD MDM: CPT | Mod: ER

## 2023-05-12 RX ORDER — ACETAMINOPHEN 500 MG
500 TABLET ORAL EVERY 6 HOURS PRN
Qty: 30 TABLET | Refills: 0 | Status: SHIPPED | OUTPATIENT
Start: 2023-05-12

## 2023-05-12 RX ORDER — IBUPROFEN 600 MG/1
600 TABLET ORAL EVERY 6 HOURS PRN
Qty: 20 TABLET | Refills: 0 | Status: SHIPPED | OUTPATIENT
Start: 2023-05-12

## 2023-05-12 RX ORDER — DICLOFENAC SODIUM 10 MG/G
2 GEL TOPICAL 4 TIMES DAILY PRN
Qty: 200 G | Refills: 0 | Status: SHIPPED | OUTPATIENT
Start: 2023-05-12

## 2023-05-12 RX ORDER — KETOROLAC TROMETHAMINE 30 MG/ML
30 INJECTION, SOLUTION INTRAMUSCULAR; INTRAVENOUS
Status: COMPLETED | OUTPATIENT
Start: 2023-05-12 | End: 2023-05-12

## 2023-05-12 RX ORDER — METHOCARBAMOL 500 MG/1
1000 TABLET, FILM COATED ORAL 3 TIMES DAILY
Qty: 30 TABLET | Refills: 0 | Status: SHIPPED | OUTPATIENT
Start: 2023-05-12 | End: 2023-05-17

## 2023-05-12 RX ADMIN — KETOROLAC TROMETHAMINE 30 MG: 30 INJECTION, SOLUTION INTRAMUSCULAR; INTRAVENOUS at 01:05

## 2023-05-12 NOTE — Clinical Note
"New Polanco (Daniel)ett was seen and treated in our emergency department on 5/12/2023.  He may return to work on 05/14/2023.       If you have any questions or concerns, please don't hesitate to call.      Elva Pedroza, DO"

## 2023-05-12 NOTE — ED PROVIDER NOTES
"Encounter Date: 5/12/2023    SCRIBE #1 NOTE: I, [place] House, am scribing for, and in the presence of,  Elva Pedroza DO. I have scribed the following portions of the note - Other sections scribed: HPI; ROS; PE.     History     Chief Complaint   Patient presents with    Knee Pain    Ankle Pain     [place], a 57 y.o. male presents to the ED via PV with CC of R knee pain and L ankle pain that started yesterday. Pt unable to wear boots or shoes to the L foot. Pt denies injury/falls/trauma, states he did slip yesterday and monday. Pt reports long periods of standing at work.       New Petit is a 57 y.o. male with Hx of HTN who presents to the ED for chief complaint of right knee pain and swelling onset 4 days ago and left ankle pain and swelling onset 1 day ago. Patient reports he was at work going up a ladder when he slipped and fell because his "legs gave out" and notes he also fell yesterday while at work. He states the pain is exacerbated with ambulation. Patient notes he was not able to put a show on his left foot. He denies associated chest pain, shortness of breath, nausea, vomiting, or diarrhea. No further complaints at this time. Patient does not have any medical allergies.       The history is provided by the patient. No  was used.   Review of patient's allergies indicates:  No Known Allergies  Past Medical History:   Diagnosis Date    Hypertension      No past surgical history on file.  No family history on file.  Social History     Tobacco Use    Smoking status: Every Day     Packs/day: 0.25     Types: Cigarettes    Smokeless tobacco: Never     Review of Systems   Constitutional:  Negative for fever.   HENT:  Negative for congestion, sore throat and trouble swallowing.    Respiratory:  Negative for cough and shortness of breath.    Cardiovascular:  Negative for chest pain.   Gastrointestinal:  Negative for abdominal pain, constipation, diarrhea, nausea and vomiting. "   Genitourinary:  Negative for dysuria, flank pain, frequency and urgency.   Musculoskeletal:  Positive for arthralgias and joint swelling. Negative for back pain.   Skin:  Negative for rash.   Neurological:  Negative for headaches.   All other systems reviewed and are negative.    Physical Exam     Initial Vitals [05/12/23 1148]   BP Pulse Resp Temp SpO2   (!) 160/71 72 18 98.2 °F (36.8 °C) 98 %      MAP       --         Physical Exam    Nursing note and vitals reviewed.  Constitutional: He appears well-developed and well-nourished.   HENT:   Head: Normocephalic and atraumatic.   Right Ear: External ear normal.   Left Ear: External ear normal.   Mouth/Throat: Oropharynx is clear and moist.   Eyes: Conjunctivae and EOM are normal. Pupils are equal, round, and reactive to light.   Neck: Neck supple.   Normal range of motion.  Cardiovascular:  Normal rate, regular rhythm, normal heart sounds and intact distal pulses.     Exam reveals no gallop and no friction rub.       No murmur heard.  Pulmonary/Chest: Breath sounds normal. No respiratory distress. He has no wheezes. He has no rhonchi. He has no rales.   Abdominal: Abdomen is soft. Bowel sounds are normal. He exhibits no distension. There is no abdominal tenderness. There is no rebound and no guarding.   Musculoskeletal:         General: No edema. Normal range of motion.      Cervical back: Normal range of motion and neck supple.      Right knee: Tenderness present.      Left ankle: Ecchymosis present. Tenderness present.      Left foot: Tenderness present.     Neurological: He is alert and oriented to person, place, and time.   Skin: Skin is warm and dry.   Psychiatric: He has a normal mood and affect. His behavior is normal.       ED Course   Procedures  Labs Reviewed - No data to display       Imaging Results              X-Ray Knee 3 View Right (Final result)  Result time 05/12/23 12:54:32      Final result by Rob Jon MD (05/12/23 12:54:32)                    Impression:      1. No acute displaced fracture or dislocation of the knee noting small suprapatellar effusion.      Electronically signed by: Rob Jon MD  Date:    05/12/2023  Time:    12:54               Narrative:    EXAMINATION:  XR KNEE 3 VIEW RIGHT    CLINICAL HISTORY:  Injury, unspecified, initial encounter    TECHNIQUE:  AP, lateral, and Merchant views of the right knee were performed.    COMPARISON:  03/09/2022    FINDINGS:  Three views right knee.    There are degenerative changes of the knee.  No acute displaced fracture or dislocation of the knee.  No radiopaque foreign body.  There is a small suprapatellar effusion.                                       X-Ray Ankle Complete Left (Final result)  Result time 05/12/23 12:52:13      Final result by Kobe Barber Jr., MD (05/12/23 12:52:13)                   Impression:      Calcaneal spurs and thickening of the Achilles tendon could indicate acute and or chronic tendon pathology including partial tear.      Electronically signed by: Kobe Sanchez Jr  Date:    05/12/2023  Time:    12:52               Narrative:    EXAMINATION:  XR ANKLE COMPLETE 3 VIEW LEFT    CLINICAL HISTORY:  Injury, unspecified, initial encounter    TECHNIQUE:  XR ANKLE COMPLETE 3 VIEW LEFT    COMPARISON:  None    FINDINGS:  Thickening of the Achilles tendon soft tissue stripe and insertional calcific spurring and or tendinosis.  Plantar calcaneal spur.  Ankle mortise preserved.                                       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  Dorsal and plantar calcaneal spur and mild midfoot osteoarthritis.  No definite fracture.                                       Medications   ketorolac injection 30 mg (30 mg Intramuscular Given 5/12/23 1318)     Medical Decision Making:   History:   Old Medical Records: I decided to obtain old medical records.  Independently Interpreted Test(s):   I have ordered and independently interpreted X-rays - see prior notes.  Clinical Tests:   Radiological Study: Ordered and Reviewed     This is an evaluation of a 57 y.o. male that presents to the Emergency Department for   Chief Complaint   Patient presents with    Knee Pain    Ankle Pain     [place], a 57 y.o. male presents to the ED via PV with CC of R knee pain and L ankle pain that started yesterday. Pt unable to wear boots or shoes to the L foot. Pt denies injury/falls/trauma, states he did slip yesterday and monday. Pt reports long periods of standing at work.         The patient is a non-toxic and well appearing patient. On physical exam, patient appears well hydrated with moist mucus membranes. Breath sounds are clear and equal bilaterally with no adventitious breath sounds, tachypnea or respiratory distress. Regular rate and rhythm. No murmurs. Abdomen soft and non tender. Patient is tolerating PO without difficulty.  Vital Signs Are Reassuring.     Differential diagnosis: Strain, Sprain, Fracture, Dislocation, Contusion.     ED Course:Treatment in the ED included Physical Exam and medications given in ED  Medications   ketorolac injection 30 mg (30 mg Intramuscular Given 5/12/23 1318)   .   Patient reports feeling better after treatment in the ER.     Labs Reviewed      No visits with results within 1 Day(s) from this visit.   Latest known visit with results is:   Admission on 05/31/2022, Discharged on 05/31/2022   Component Date Value Ref Range Status    SARS-CoV2 (COVID-19)  Qualitative P* 05/31/2022 Not Detected  Not Detected Final    Comment: This test utilizes a real-time reverse transcription  polymerase chain reaction procedure to amplify and   detect the SARS-CoV-2 RdRp and N genes.    The analytical sensitivity (limit of detection) of   this assay is 100 copies/mL.     A Detected result is considered positive for COVID-19.  This patient is considered infected with the   SARS-CoV-2 virus and is presumed to be contagious.    A Not Detected result means that SARS-CoV-2 RNA is not  present above the limit of detection. It does not rule  out the possibility of COVID-19 and should not be the  sole basis for treatment decisions.  If COVID-19 is   strongly suspected based on clinical and exposure   history,re-testing should be considered.      This test is only for use under Food and Drug   Administration s Emergency Use Authorization (EUA).   Commercial reagents are provided by [employer].  Performance characteristics of the EUA have been   independently verified by [facility]   Department of Pathology a                           nd Laboratory Medicine.        POC Rapid Strep A 05/31/2022 negative  Positive/Negative Final    Influenza B Ag 05/31/2022 negative  Positive/Negative Final    Inflenza A Ag 05/31/2022 positive (A)  Positive/Negative Final    SARS-COV-2- Cycle Number 05/31/2022 N/A   Final    Comment: CT (Cycle Threshold) values are surrogate markers of   nucleic acid concentration in a sample. They are non-standard  measurements and should only be interpreted by those familiar   with both PCR technology and the patient's clinical presentation.          Imaging Reviewed    Imaging Results              X-Ray Knee 3 View Right (Final result)  Result time 05/12/23 12:54:32      Final result by Rob Jon MD (05/12/23 12:54:32)                   Impression:      1. No acute displaced fracture or dislocation of the knee noting small suprapatellar  effusion.      Electronically signed by: Rob Jon MD  Date:    05/12/2023  Time:    12:54               Narrative:    EXAMINATION:  XR KNEE 3 VIEW RIGHT    CLINICAL HISTORY:  Injury, unspecified, initial encounter    TECHNIQUE:  AP, lateral, and Merchant views of the right knee were performed.    COMPARISON:  03/09/2022    FINDINGS:  Three views right knee.    There are degenerative changes of the knee.  No acute displaced fracture or dislocation of the knee.  No radiopaque foreign body.  There is a small suprapatellar effusion.                                       X-Ray Ankle Complete Left (Final result)  Result time 05/12/23 12:52:13      Final result by Kobe Barber Jr., MD (05/12/23 12:52:13)                   Impression:      Calcaneal spurs and thickening of the Achilles tendon could indicate acute and or chronic tendon pathology including partial tear.      Electronically signed by: Kobe Sanchez Jr  Date:    05/12/2023  Time:    12:52               Narrative:    EXAMINATION:  XR ANKLE COMPLETE 3 VIEW LEFT    CLINICAL HISTORY:  Injury, unspecified, initial encounter    TECHNIQUE:  XR ANKLE COMPLETE 3 VIEW LEFT    COMPARISON:  None    FINDINGS:  Thickening of the Achilles tendon soft tissue stripe and insertional calcific spurring and or tendinosis.  Plantar calcaneal spur.  Ankle mortise preserved.                                       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  Dorsal and plantar calcaneal spur and mild midfoot  osteoarthritis.  No definite fracture.                                      In shared decision making with the patient, we discussed treatment, prescriptions, labs, and imaging results.    Discharge home with   ED Prescriptions       Medication Sig Dispense Start Date End Date Auth. Provider    methocarbamoL (ROBAXIN) 500 MG Tab Take 2 tablets (1,000 mg total) by mouth 3 (three) times daily. for 5 days 30 tablet 5/12/2023 5/17/2023 Elva Pedroza DO    acetaminophen (TYLENOL) 500 MG tablet Take 1 tablet (500 mg total) by mouth every 6 (six) hours as needed for Pain (As needed for mild-to-moderate pain). 30 tablet 5/12/2023 -- Elva Pedroza DO    diclofenac sodium (VOLTAREN) 1 % Gel Apply 2 g topically 4 (four) times daily as needed (Apply to painful area 4 times a day as needed for pain). 200 g 5/12/2023 -- Elva Pedroza DO    ibuprofen (ADVIL,MOTRIN) 600 MG tablet Take 1 tablet (600 mg total) by mouth every 6 (six) hours as needed for Pain (Take with food as needed for mild-to-moderate pain). 20 tablet 5/12/2023 -- Elva Pedroza DO          Fill and take prescriptions as directed.  Return to the ED if symptoms worsen or do not resolve.   Answered questions and discussed discharge plan.    Patient feels better and is ready for discharge.  Follow up with PCP/specialist in 1 day       Scribe Attestation:   Scribe #1: I performed the above scribed service and the documentation accurately describes the services I performed. I attest to the accuracy of the note.             I, Dr. Elva Pedroza, personally performed the services described in this documentation. This document was produced by a scribe under my direction and in my presence. All medical record entries made by the scribe were at my direction and in my presence.  I have reviewed the chart and agree that the record reflects my personal performance and is accurate and complete. Elva Pedroza DO.     05/13/2023 8:24 AM         Clinical Impression:   Final  diagnoses:  [T14.90XA] Injury  [M25.561] Acute pain of right knee (Primary)  [T14.8XXA] Contusion of soft tissue  [M25.572] Acute left ankle pain        ED Disposition Condition    Discharge Stable          ED Prescriptions       Medication Sig Dispense Start Date End Date Auth. Provider    methocarbamoL (ROBAXIN) 500 MG Tab Take 2 tablets (1,000 mg total) by mouth 3 (three) times daily. for 5 days 30 tablet 5/12/2023 5/17/2023 Elva Pedroza DO    acetaminophen (TYLENOL) 500 MG tablet Take 1 tablet (500 mg total) by mouth every 6 (six) hours as needed for Pain (As needed for mild-to-moderate pain). 30 tablet 5/12/2023 -- Elva Pedroza DO    diclofenac sodium (VOLTAREN) 1 % Gel Apply 2 g topically 4 (four) times daily as needed (Apply to painful area 4 times a day as needed for pain). 200 g 5/12/2023 -- Elva Pedroza DO    ibuprofen (ADVIL,MOTRIN) 600 MG tablet Take 1 tablet (600 mg total) by mouth every 6 (six) hours as needed for Pain (Take with food as needed for mild-to-moderate pain). 20 tablet 5/12/2023 -- Elva Pedroza DO          Follow-up Information       Follow up With Specialties Details Why Contact Info    David Shirley MD Family Medicine Schedule an appointment as soon as possible for a visit in 2 days  [address]  [place] [address]  [phone]      Meron Ballard MD Orthopedic Surgery Schedule an appointment as soon as possible for a visit in 1 day Follow-up as needed for further evaluation of your ankle and knee pain [address] [facility] [address]  [phone]      [facility] - Emergency Dept Emergency Medicine Go to  Please go to [facility] emergency department if symptoms worsen [address]  [place] [address] [phone]             Elva Pedroza DO  05/13/23 0824

## 2023-06-16 ENCOUNTER — PATIENT MESSAGE (OUTPATIENT)
Dept: PODIATRY | Facility: CLINIC | Age: 58
End: 2023-06-16
Payer: MEDICAID

## 2024-06-25 ENCOUNTER — PATIENT MESSAGE (OUTPATIENT)
Dept: PODIATRY | Facility: CLINIC | Age: 59
End: 2024-06-25

## 2024-06-25 DIAGNOSIS — M79.672 FOOT PAIN, LEFT: Primary | ICD-10-CM

## 2025-05-16 ENCOUNTER — HOSPITAL ENCOUNTER (OUTPATIENT)
Facility: HOSPITAL | Age: 60
Discharge: HOME | DRG: 305 | End: 2025-05-17
Attending: EMERGENCY MEDICINE | Admitting: HOSPITALIST
Payer: MEDICAID

## 2025-05-16 DIAGNOSIS — I16.1 HYPERTENSIVE EMERGENCY: Primary | ICD-10-CM

## 2025-05-16 DIAGNOSIS — R51.9 NONINTRACTABLE HEADACHE, UNSPECIFIED CHRONICITY PATTERN, UNSPECIFIED HEADACHE TYPE: ICD-10-CM

## 2025-05-16 DIAGNOSIS — R07.9 CHEST PAIN: ICD-10-CM

## 2025-05-16 DIAGNOSIS — R29.818 ACUTE FOCAL NEUROLOGICAL DEFICIT: ICD-10-CM

## 2025-05-16 DIAGNOSIS — R42 DIZZINESS: ICD-10-CM

## 2025-05-16 LAB
ABSOLUTE EOSINOPHIL (OHS): 0.15 K/UL
ABSOLUTE MONOCYTE (OHS): 0.92 K/UL (ref 0.3–1)
ABSOLUTE NEUTROPHIL COUNT (OHS): 5.66 K/UL (ref 1.8–7.7)
ALBUMIN SERPL-MCNC: 3.7 G/DL (ref 3.3–5.5)
ALBUMIN SERPL-MCNC: 3.8 G/DL (ref 3.3–5.5)
ALLENS TEST: ABNORMAL
ALP SERPL-CCNC: 70 U/L (ref 42–141)
ALP SERPL-CCNC: 71 U/L (ref 42–141)
BASOPHILS # BLD AUTO: 0.02 K/UL
BASOPHILS NFR BLD AUTO: 0.2 %
BILIRUB SERPL-MCNC: 0.7 MG/DL (ref 0.2–1.6)
BILIRUB SERPL-MCNC: 1 MG/DL (ref 0.2–1.6)
BUN SERPL-MCNC: 14 MG/DL (ref 7–22)
BUN SERPL-MCNC: 15 MG/DL (ref 7–22)
CALCIUM SERPL-MCNC: 10 MG/DL (ref 8–10.3)
CALCIUM SERPL-MCNC: 9.1 MG/DL (ref 8–10.3)
CHLORIDE SERPL-SCNC: 106 MMOL/L (ref 98–108)
CHLORIDE SERPL-SCNC: 111 MMOL/L (ref 98–108)
CREAT SERPL-MCNC: 1 MG/DL (ref 0.6–1.2)
CREAT SERPL-MCNC: 1.3 MG/DL (ref 0.6–1.2)
ERYTHROCYTE [DISTWIDTH] IN BLOOD BY AUTOMATED COUNT: 13.5 % (ref 11.5–14.5)
GLUCOSE SERPL-MCNC: 104 MG/DL (ref 73–118)
GLUCOSE SERPL-MCNC: 111 MG/DL (ref 70–110)
GLUCOSE SERPL-MCNC: 111 MG/DL (ref 73–118)
HCO3 UR-SCNC: 27.3 MMOL/L (ref 24–28)
HCT VFR BLD AUTO: 45.9 % (ref 40–54)
HCT, POC: NORMAL
HGB BLD-MCNC: 15.3 GM/DL (ref 14–18)
HGB, POC: NORMAL (ref 14–18)
IMM GRANULOCYTES # BLD AUTO: 0.03 K/UL (ref 0–0.04)
IMM GRANULOCYTES NFR BLD AUTO: 0.3 % (ref 0–0.5)
LDH SERPL L TO P-CCNC: 0.62 MMOL/L (ref 0.5–2.2)
LYMPHOCYTES # BLD AUTO: 2.97 K/UL (ref 1–4.8)
MAGNESIUM SERPL-MCNC: 2.1 MG/DL (ref 1.6–2.6)
MCH RBC QN AUTO: 26.7 PG (ref 27–31)
MCH, POC: NORMAL
MCHC RBC AUTO-ENTMCNC: 33.3 G/DL (ref 32–36)
MCHC, POC: NORMAL
MCV RBC AUTO: 80 FL (ref 82–98)
MCV, POC: NORMAL
MPV, POC: NORMAL
NUCLEATED RBC (/100WBC) (OHS): 0 /100 WBC
OHS QRS DURATION: 84 MS
OHS QRS DURATION: 88 MS
OHS QTC CALCULATION: 380 MS
OHS QTC CALCULATION: 384 MS
PCO2 BLDA: 50 MMHG (ref 35–45)
PH SMN: 7.34 [PH] (ref 7.35–7.45)
PLATELET # BLD AUTO: 187 K/UL (ref 150–450)
PMV BLD AUTO: 10.4 FL (ref 9.2–12.9)
PO2 BLDA: 34 MMHG (ref 40–60)
POC ALT (SGPT): 19 U/L (ref 10–47)
POC ALT (SGPT): 23 U/L (ref 10–47)
POC AST (SGOT): 26 U/L (ref 11–38)
POC AST (SGOT): 26 U/L (ref 11–38)
POC BE: 1 MMOL/L (ref -2–2)
POC CARDIAC TROPONIN I: 0 NG/ML (ref 0–0.08)
POC PLATELET COUNT: NORMAL
POC PTINR: 1 (ref 0.9–1.2)
POC PTWBT: 9.9 SEC (ref 9.7–14.3)
POC SATURATED O2: 62 % (ref 95–100)
POC TCO2: 29 MMOL/L (ref 18–33)
POC TCO2: 29 MMOL/L (ref 24–29)
POC TCO2: 30 MMOL/L (ref 18–33)
POCT GLUCOSE: 111 MG/DL (ref 70–110)
POTASSIUM BLD-SCNC: 5.4 MMOL/L (ref 3.6–5.1)
POTASSIUM BLD-SCNC: 5.5 MMOL/L (ref 3.6–5.1)
PROTEIN, POC: 7.3 G/DL (ref 6.4–8.1)
PROTEIN, POC: 8 G/DL (ref 6.4–8.1)
RBC # BLD AUTO: 5.73 M/UL (ref 4.6–6.2)
RBC, POC: NORMAL
RDW, POC: NORMAL
RELATIVE EOSINOPHIL (OHS): 1.5 %
RELATIVE LYMPHOCYTE (OHS): 30.5 % (ref 18–48)
RELATIVE MONOCYTE (OHS): 9.4 % (ref 4–15)
RELATIVE NEUTROPHIL (OHS): 58.1 % (ref 38–73)
SAMPLE: ABNORMAL
SAMPLE: NORMAL
SAMPLE: NORMAL
SITE: ABNORMAL
SODIUM BLD-SCNC: 142 MMOL/L (ref 128–145)
SODIUM BLD-SCNC: 142 MMOL/L (ref 128–145)
WBC # BLD AUTO: 9.75 K/UL (ref 3.9–12.7)
WBC, POC: NORMAL

## 2025-05-16 PROCEDURE — G0378 HOSPITAL OBSERVATION PER HR: HCPCS | Mod: ER

## 2025-05-16 PROCEDURE — 99285 EMERGENCY DEPT VISIT HI MDM: CPT | Mod: 25,ER

## 2025-05-16 PROCEDURE — 99900035 HC TECH TIME PER 15 MIN (STAT)

## 2025-05-16 PROCEDURE — 93005 ELECTROCARDIOGRAM TRACING: CPT | Mod: ER

## 2025-05-16 PROCEDURE — 82962 GLUCOSE BLOOD TEST: CPT | Mod: ER

## 2025-05-16 PROCEDURE — 36415 COLL VENOUS BLD VENIPUNCTURE: CPT

## 2025-05-16 PROCEDURE — 25000003 PHARM REV CODE 250

## 2025-05-16 PROCEDURE — 96374 THER/PROPH/DIAG INJ IV PUSH: CPT | Mod: ER

## 2025-05-16 PROCEDURE — 99291 CRITICAL CARE FIRST HOUR: CPT | Mod: ER

## 2025-05-16 PROCEDURE — 93010 ELECTROCARDIOGRAM REPORT: CPT | Mod: ,,, | Performed by: INTERNAL MEDICINE

## 2025-05-16 PROCEDURE — 83735 ASSAY OF MAGNESIUM: CPT

## 2025-05-16 PROCEDURE — 85025 COMPLETE CBC W/AUTO DIFF WBC: CPT

## 2025-05-16 PROCEDURE — 82803 BLOOD GASES ANY COMBINATION: CPT | Mod: ER

## 2025-05-16 PROCEDURE — 84484 ASSAY OF TROPONIN QUANT: CPT | Mod: ER

## 2025-05-16 PROCEDURE — 25500020 PHARM REV CODE 255: Mod: ER | Performed by: EMERGENCY MEDICINE

## 2025-05-16 PROCEDURE — 85610 PROTHROMBIN TIME: CPT | Mod: ER

## 2025-05-16 PROCEDURE — 94799 UNLISTED PULMONARY SVC/PX: CPT

## 2025-05-16 PROCEDURE — G0378 HOSPITAL OBSERVATION PER HR: HCPCS

## 2025-05-16 PROCEDURE — 63600175 PHARM REV CODE 636 W HCPCS: Mod: ER | Performed by: EMERGENCY MEDICINE

## 2025-05-16 PROCEDURE — 99284 EMERGENCY DEPT VISIT MOD MDM: CPT | Mod: 95,,, | Performed by: PSYCHIATRY & NEUROLOGY

## 2025-05-16 PROCEDURE — 25000003 PHARM REV CODE 250: Mod: ER | Performed by: EMERGENCY MEDICINE

## 2025-05-16 PROCEDURE — 20000000 HC ICU ROOM

## 2025-05-16 PROCEDURE — 80053 COMPREHEN METABOLIC PANEL: CPT | Mod: ER

## 2025-05-16 PROCEDURE — 85025 COMPLETE CBC W/AUTO DIFF WBC: CPT | Mod: ER

## 2025-05-16 RX ORDER — HYDRALAZINE HYDROCHLORIDE 20 MG/ML
10 INJECTION INTRAMUSCULAR; INTRAVENOUS EVERY 6 HOURS PRN
Status: DISCONTINUED | OUTPATIENT
Start: 2025-05-16 | End: 2025-05-16

## 2025-05-16 RX ORDER — NALOXONE HCL 0.4 MG/ML
0.02 VIAL (ML) INJECTION
Status: DISCONTINUED | OUTPATIENT
Start: 2025-05-16 | End: 2025-05-17 | Stop reason: HOSPADM

## 2025-05-16 RX ORDER — POLYETHYLENE GLYCOL 3350 17 G/17G
17 POWDER, FOR SOLUTION ORAL DAILY
Status: DISCONTINUED | OUTPATIENT
Start: 2025-05-17 | End: 2025-05-17 | Stop reason: HOSPADM

## 2025-05-16 RX ORDER — MECLIZINE HYDROCHLORIDE 25 MG/1
25 TABLET ORAL
Status: COMPLETED | OUTPATIENT
Start: 2025-05-16 | End: 2025-05-16

## 2025-05-16 RX ORDER — NICARDIPINE HYDROCHLORIDE 0.2 MG/ML
5 INJECTION INTRAVENOUS CONTINUOUS
Status: DISCONTINUED | OUTPATIENT
Start: 2025-05-16 | End: 2025-05-17

## 2025-05-16 RX ORDER — IBUPROFEN 200 MG
24 TABLET ORAL
Status: DISCONTINUED | OUTPATIENT
Start: 2025-05-16 | End: 2025-05-17 | Stop reason: HOSPADM

## 2025-05-16 RX ORDER — GUAIFENESIN 100 MG/5ML
200 LIQUID ORAL EVERY 4 HOURS PRN
Status: DISCONTINUED | OUTPATIENT
Start: 2025-05-16 | End: 2025-05-17 | Stop reason: HOSPADM

## 2025-05-16 RX ORDER — DEXTROMETHORPHAN POLISTIREX 30 MG/5 ML
1 SUSPENSION, EXTENDED RELEASE 12 HR ORAL DAILY PRN
Status: DISCONTINUED | OUTPATIENT
Start: 2025-05-16 | End: 2025-05-17 | Stop reason: HOSPADM

## 2025-05-16 RX ORDER — LOPERAMIDE HYDROCHLORIDE 2 MG/1
4 CAPSULE ORAL ONCE AS NEEDED
Status: DISCONTINUED | OUTPATIENT
Start: 2025-05-16 | End: 2025-05-17 | Stop reason: HOSPADM

## 2025-05-16 RX ORDER — PROCHLORPERAZINE EDISYLATE 5 MG/ML
5 INJECTION INTRAMUSCULAR; INTRAVENOUS EVERY 6 HOURS PRN
Status: DISCONTINUED | OUTPATIENT
Start: 2025-05-16 | End: 2025-05-17 | Stop reason: HOSPADM

## 2025-05-16 RX ORDER — ACETAMINOPHEN 325 MG/1
650 TABLET ORAL EVERY 4 HOURS PRN
Status: DISCONTINUED | OUTPATIENT
Start: 2025-05-16 | End: 2025-05-17 | Stop reason: HOSPADM

## 2025-05-16 RX ORDER — SODIUM CHLORIDE 0.9 % (FLUSH) 0.9 %
10 SYRINGE (ML) INJECTION EVERY 12 HOURS PRN
Status: DISCONTINUED | OUTPATIENT
Start: 2025-05-16 | End: 2025-05-17 | Stop reason: HOSPADM

## 2025-05-16 RX ORDER — LOSARTAN POTASSIUM 25 MG/1
50 TABLET ORAL DAILY
Status: DISCONTINUED | OUTPATIENT
Start: 2025-05-17 | End: 2025-05-17

## 2025-05-16 RX ORDER — ALBUTEROL SULFATE 2.5 MG/.5ML
2.5 SOLUTION RESPIRATORY (INHALATION) EVERY 6 HOURS PRN
Status: DISCONTINUED | OUTPATIENT
Start: 2025-05-16 | End: 2025-05-17 | Stop reason: HOSPADM

## 2025-05-16 RX ORDER — PANTOPRAZOLE SODIUM 40 MG/1
40 TABLET, DELAYED RELEASE ORAL DAILY
Status: DISCONTINUED | OUTPATIENT
Start: 2025-05-17 | End: 2025-05-17 | Stop reason: HOSPADM

## 2025-05-16 RX ORDER — IBUPROFEN 200 MG
16 TABLET ORAL
Status: DISCONTINUED | OUTPATIENT
Start: 2025-05-16 | End: 2025-05-17 | Stop reason: HOSPADM

## 2025-05-16 RX ORDER — ONDANSETRON HYDROCHLORIDE 2 MG/ML
4 INJECTION, SOLUTION INTRAVENOUS EVERY 6 HOURS PRN
Status: DISCONTINUED | OUTPATIENT
Start: 2025-05-16 | End: 2025-05-17 | Stop reason: HOSPADM

## 2025-05-16 RX ORDER — AMLODIPINE BESYLATE 5 MG/1
10 TABLET ORAL DAILY
Status: DISCONTINUED | OUTPATIENT
Start: 2025-05-17 | End: 2025-05-17 | Stop reason: HOSPADM

## 2025-05-16 RX ORDER — ALBUTEROL SULFATE 90 UG/1
1 INHALANT RESPIRATORY (INHALATION) EVERY 6 HOURS PRN
Status: DISCONTINUED | OUTPATIENT
Start: 2025-05-16 | End: 2025-05-16

## 2025-05-16 RX ORDER — GLUCAGON 1 MG
1 KIT INJECTION
Status: DISCONTINUED | OUTPATIENT
Start: 2025-05-16 | End: 2025-05-17 | Stop reason: HOSPADM

## 2025-05-16 RX ORDER — ALUMINUM HYDROXIDE, MAGNESIUM HYDROXIDE, AND SIMETHICONE 1200; 120; 1200 MG/30ML; MG/30ML; MG/30ML
30 SUSPENSION ORAL 4 TIMES DAILY PRN
Status: DISCONTINUED | OUTPATIENT
Start: 2025-05-16 | End: 2025-05-17 | Stop reason: HOSPADM

## 2025-05-16 RX ORDER — NICARDIPINE HYDROCHLORIDE 0.2 MG/ML
5 INJECTION INTRAVENOUS CONTINUOUS
Status: DISCONTINUED | OUTPATIENT
Start: 2025-05-16 | End: 2025-05-16

## 2025-05-16 RX ORDER — BENZONATATE 100 MG/1
100 CAPSULE ORAL 3 TIMES DAILY PRN
Status: DISCONTINUED | OUTPATIENT
Start: 2025-05-16 | End: 2025-05-17 | Stop reason: HOSPADM

## 2025-05-16 RX ORDER — TALC
6 POWDER (GRAM) TOPICAL NIGHTLY PRN
Status: DISCONTINUED | OUTPATIENT
Start: 2025-05-16 | End: 2025-05-17 | Stop reason: HOSPADM

## 2025-05-16 RX ADMIN — NICARDIPINE HYDROCHLORIDE 5 MG/HR: 0.2 INJECTION, SOLUTION INTRAVENOUS at 11:05

## 2025-05-16 RX ADMIN — MECLIZINE HYDROCHLORIDE 25 MG: 25 TABLET ORAL at 12:05

## 2025-05-16 RX ADMIN — ACETAMINOPHEN 650 MG: 325 TABLET ORAL at 05:05

## 2025-05-16 RX ADMIN — ACETAMINOPHEN 650 MG: 325 TABLET ORAL at 11:05

## 2025-05-16 RX ADMIN — IOHEXOL 85 ML: 350 INJECTION, SOLUTION INTRAVENOUS at 12:05

## 2025-05-16 NOTE — ASSESSMENT & PLAN NOTE
Patient has a current diagnosis of Hypertensive emergency with end organ damage evidenced by headache and vertigo which is controlled.  Latest blood pressure and vitals reviewed-   Temp:  [97.7 °F (36.5 °C)]   Pulse:  [50-65]   Resp:  [16-24]   BP: (131-227)/()   SpO2:  [95 %-99 %] .   Patient currently on IV antihypertensives.   Home meds for hypertension were reviewed and noted below.   Hypertension Medications              amLODIPine (NORVASC) 10 MG tablet     losartan (COZAAR) 50 MG tablet Take 50 mg by mouth once daily.        Medication adjustment for hospital antihypertensives is as follows- HTN is poorly controlled at home.  Only dispensed med is amlodipine 5 daily, and he says his home SBP runs in the 180s usually.  No recent PCP visits noted, yet med refills continued.  High risk of cardiovascular event if current long-term management left unchanged.    Will aim for controlled BP reduction by medications noted above, with a max daily reduction of 25%  Enacting management per 2023 AHA/ACC HTN treatment guidelines as per above.  Next step per guidelines after max ARB dose reached is to add HCTZ, and then a Beta blocker.  Will see if we can enroll him in digital hypertension program, although he is noted to never have logged in to [employer]  Monitor and mitigate end organ damage as indicated  Will ensure close PCP follow-up in place

## 2025-05-16 NOTE — H&P
[facility] Medicine  History & Physical    Patient Name: New Petit  MRN: [id number]  Patient Class: IP- Inpatient  Admission Date: 5/16/2025  Attending Physician: Swapna Sheikh, *   Primary Care Provider: David Shirley MD         Patient information was obtained from patient, past medical records, and ER records.     Subjective:     Principal Problem:Hypertensive emergency    Chief Complaint:   Chief Complaint   Patient presents with    Headache     Severe headache, dizziness, and neck pain onset 0715.         HPI: New Petit is a 59 y.o. male with Asthma and HTN who presented to [facility]'s [place] ED for eval and treatment of severe headache and dizziness. The patient states he had a headache last night so he checked his BP. His blood pressure was high so he took an extra dose of his blood pressure medication. He went to bed around 8:00 p.m. He woke around 7:00 a.m. with a headache and immediately noted the room was spinning. He states his headache is severe and his head feels larger than his body.  The pain radiates into his neck.  He was unable to go to work due to the HA and dizziness.  No trouble talking but reports he is unsteady when he walks.   No notable recent healthcare encounters.  Reports his home SBP usually runs around 180, and that he is taking all home medications as prescribed: chart review of these meds indicates he has only been filling amlodipine 5 daily.    ED course: BP is 227/109.  Nicardipine drip ordered for hypertensive emergency.  After tele neurology evaluation, CTA was advised at this time.  No TNK.  CTA with no LVO or aneurysm.  BP improved with cardene drip.  HA and dizziness had almost resolved on initial  eval after his arrival to the ICU.   Labs with mildly elevated potassium. Normal troponin and creatinine.  They were admitted to [facility] ICU under the care of Hospital Medicine for further evaluation and treatment.            Past Medical History:   Diagnosis Date    Hypertension        History reviewed. No pertinent surgical history.    Review of patient's allergies indicates:  No Known Allergies    No current facility-administered medications on file prior to encounter.     Current Outpatient Medications on File Prior to Encounter   Medication Sig    amLODIPine (NORVASC) 10 MG tablet     diclofenac sodium (VOLTAREN) 1 % Gel Apply 2 g topically 4 (four) times daily as needed (Apply to painful area 4 times a day as needed for pain).    acetaminophen (TYLENOL) 500 MG tablet Take 1 tablet (500 mg total) by mouth every 6 (six) hours as needed for Pain (As needed for mild-to-moderate pain).    albuterol (PROVENTIL/VENTOLIN HFA) 90 mcg/actuation inhaler INHALE 1 PUFF INTO THE LUNGS EVERY 6 HOURS AS NEEDED FOR SHORTNESS OF BREATH    ANORO ELLIPTA 62.5-25 mcg/actuation DsDv Inhale 1 puff into the lungs once daily.    ibuprofen (ADVIL,MOTRIN) 600 MG tablet Take 1 tablet (600 mg total) by mouth every 6 (six) hours as needed for Pain (Take with food as needed for mild-to-moderate pain).    LIDOcaine (LIDODERM) 5 % SMARTSIG:Topical    loratadine (CLARITIN) 10 mg tablet Take 1 tablet (10 mg total) by mouth once daily.    losartan (COZAAR) 50 MG tablet Take 50 mg by mouth once daily.    pantoprazole (PROTONIX) 40 MG tablet Take 40 mg by mouth once daily.     Family History    None       Tobacco Use    Smoking status: Every Day     Current packs/day: 0.25     Types: Cigarettes    Smokeless tobacco: Never   Substance and Sexual Activity    Alcohol use: Not on file    Drug use: Not on file    Sexual activity: Not on file     Review of Systems   Reason unable to perform ROS: ROS was performed and pertinent +s and -s are listed in HPI.     Objective:     Vital Signs (Most Recent):  Temp: 97.7 °F (36.5 °C) (05/16/25 1640)  Pulse: (!) 53 (05/16/25 1800)  Resp: (!) 23 (05/16/25 1800)  BP: (!) 189/88 (05/16/25 1800)  SpO2: 98 % (05/16/25 1800) Vital  "Signs (24h Range):  Temp:  [97.7 °F (36.5 °C)] 97.7 °F (36.5 °C)  Pulse:  [50-65] 53  Resp:  [16-24] 23  SpO2:  [95 %-99 %] 98 %  BP: (131-227)/() 189/88     Weight: 102.2 kg (225 lb 5 oz)  Body mass index is 33.27 kg/m².     Physical Exam  Constitutional:       General: He is not in acute distress.     Appearance: Normal appearance. He is not ill-appearing.   HENT:      Head: Normocephalic.   Neck:      Comments: JVP not elevated  Cardiovascular:      Rate and Rhythm: Normal rate and regular rhythm.      Pulses: Normal pulses.      Heart sounds: Normal heart sounds.   Pulmonary:      Effort: Pulmonary effort is normal.      Breath sounds: Normal breath sounds.   Abdominal:      General: Abdomen is flat. Bowel sounds are normal.      Tenderness: There is no abdominal tenderness. There is no guarding.   Musculoskeletal:      Right lower leg: No edema.      Left lower leg: No edema.   Skin:     General: Skin is warm and dry.      Capillary Refill: Capillary refill takes less than 2 seconds.      Coloration: Skin is not jaundiced.   Neurological:      General: No focal deficit present.      Mental Status: He is alert and oriented to person, place, and time.   Psychiatric:         Mood and Affect: Mood normal.         Behavior: Behavior normal.                Significant Labs: All pertinent labs within the past 24 hours have been reviewed.  CBC: No results for input(s): "WBC", "HGB", "HCT", "PLT" in the last 48 hours.  CMP: No results for input(s): "NA", "K", "CL", "CO2", "GLU", "BUN", "CREATININE", "CALCIUM", "PROT", "ALBUMIN", "BILITOT", "ALKPHOS", "AST", "ALT", "ANIONGAP", "EGFRNONAA" in the last 48 hours.    Invalid input(s): "ESTGFAFRICA"  Cardiac Markers: No results for input(s): "CKMB", "MYOGLOBIN", "BNP", "TROPISTAT" in the last 48 hours.  Magnesium: No results for input(s): "MG" in the last 48 hours.  Troponin: No results for input(s): "TROPONINI", "TROPONINIHS" in the last 48 hours.    Significant " Imaging: I have reviewed all pertinent imaging results/findings within the past 24 hours.    Results for orders placed or performed during the hospital encounter of 05/16/25 (from the past 2160 hours)   CTA Head and Neck (xpd)    Narrative    EXAMINATION:  CTA HEAD AND NECK (XPD)    CLINICAL HISTORY:  Dizziness, persistent/recurrent, cardiac or vascular cause suspected; headache.    TECHNIQUE:  Non contrast low dose axial images were obtained through the head.  CT angiogram was performed from the level of the mango to the top of the head following the IV administration of 85mL of Omnipaque 350.   Sagittal and coronal reconstructions and maximum intensity projection reconstructions were performed. Arterial stenosis percentages are based on NASCET measurement criteria.    3D reformats were created on an independent workstation to evaluate the [id number].    COMPARISON:  Head CT 05/16/2025    FINDINGS:  No enhancing intracranial lesion.    CTA:    No advanced stenosis at the origins of the vessels from the aortic arch.    No significant stenosis at the origin of the right vertebral artery which is the dominant vessel..  The left vertebral artery originates directly from the aortic arch, with underlying stenosis suggested, and enters the foramen transversarium at the C5 level and may terminate as the PICA.    No significant stenosis at the carotid bifurcations by NASCET criteria.    Intracranially there is no major branch advanced stenosis/occlusion.  Developmentally the posterior communicating arteries are prominent bilaterally.    No aneurysm. The venous sinuses are patent.    No soft tissue mass in the neck.    No acute cervical fracture however there is diffuse sclerosis of the osseous structures of unclear etiology but may be metabolic in nature if clinically consistent.      Impression    No major branch advanced stenosis/occlusion intracranially at the [id number].  No evidence of aneurysm.  The  venous sinuses are patent.    No significant stenosis at the carotid bifurcations by NASCET criteria.    No significant stenosis of the dominant right vertebral artery with likely prominent stenosis at the origin of the non dominant left vertebral artery that may terminate as the PICA.  The basilar artery is patent.    There is diffuse sclerosis throughout the osseous elements of the cervical spine of unclear etiology but may be metabolic in nature unless otherwise suspected clinically for clinical correlation.      Electronically signed by: Cayetano Adams  Date:    05/16/2025  Time:    13:32   CT Head Without Contrast    Narrative    EXAMINATION:  CT HEAD WITHOUT CONTRAST    CLINICAL HISTORY:  Neuro deficit, acute, stroke suspected;    TECHNIQUE:  Low dose axial images were obtained through the head.  Coronal and sagittal reformations were also performed. Contrast was not administered.    COMPARISON:  None.    FINDINGS:  Blood: No acute intracranial hemorrhage.    Parenchyma: No definite loss of gray-white differentiation to suggest acute or subacute transcortical infarct.    Ventricles/Extra-axial spaces: No abnormal extra-axial fluid collection. Basal cisterns are patent.    Vessels: Mild atherosclerotic calcification.    Orbits: Unremarkable.    Scalp: Unremarkable.    Skull: There are no depressed skull fractures or destructive bone lesions.    Sinuses and mastoids: Relatively minimal paranasal sinus mucosal thickening.    Other findings: None      Impression    No acute intracranial findings.      Electronically signed by: Mio Cantu  Date:    05/16/2025  Time:    11:42       Assessment/Plan:     Assessment & Plan  Hypertensive emergency  Patient has a current diagnosis of Hypertensive emergency with end organ damage evidenced by headache and vertigo which is controlled.  Latest blood pressure and vitals reviewed-   Temp:  [97.7 °F (36.5 °C)]   Pulse:  [50-65]   Resp:  [16-24]   BP: (131-227)/()    SpO2:  [95 %-99 %] .   Patient currently on IV antihypertensives.   Home meds for hypertension were reviewed and noted below.   Hypertension Medications              amLODIPine (NORVASC) 10 MG tablet     losartan (COZAAR) 50 MG tablet Take 50 mg by mouth once daily.        Medication adjustment for hospital antihypertensives is as follows- HTN is poorly controlled at home.  Only dispensed med is amlodipine 5 daily, and he says his home SBP runs in the 180s usually.  No recent PCP visits noted, yet med refills continued.  High risk of cardiovascular event if current long-term management left unchanged.    Will aim for controlled BP reduction by medications noted above, with a max daily reduction of 25%  Enacting management per 2023 AHA/ACC HTN treatment guidelines as per above.  Next step per guidelines after max ARB dose reached is to add HCTZ, and then a Beta blocker.  Will see if we can enroll him in digital hypertension program, although he is noted to never have logged in to [employer]  Monitor and mitigate end organ damage as indicated  Will ensure close PCP follow-up in place      Vertigo  As above.    VTE Risk Mitigation (From admission, onward)           Ordered     IP VTE HIGH RISK PATIENT  Once         05/16/25 1659     Place sequential compression device  Until discontinued         05/16/25 1659     Reason for No Pharmacological VTE Prophylaxis  Once        Question:  Reasons:  Answer:  Risk of Bleeding    05/16/25 1659                  Critical care time spent on the evaluation and treatment of severe organ dysfunction, review of pertinent labs and imaging studies, discussions with consulting providers and discussions with patient/family: 45 minutes.                  Laurent Burger MD  Department of Hospital Medicine  [facility] - Intensive Care

## 2025-05-16 NOTE — ED NOTES
Patient back from CT. States head pain has decreased. Now rates pain 4/10. Cardiac monitor in place. MD reassessed patient. To continue cardene at this time. Will cont to monitor

## 2025-05-16 NOTE — ED NOTES
On call with [facility] for stroke activation and/or tele stroke consult.  Patient is currently in CT, will be transported to room trauma 2 once CT scan is complete.  Tele stroke provider notified at 1128, Dr. Fisher.

## 2025-05-16 NOTE — ED NOTES
Patient presents to ed with c.o headache since last night. Patient has PMH of HTN. Pt presents with elevated BP. Patient states he took his BP last night and it was elevated. Patient presents with nausea and vomiting. Patient c/o dizziness since 0715 this morning. Patient reports weakness. Patient has symmetrical facial expressions. Moves bilat upper and lower ext equally. Denies unilateral weakness. Cardiac monitor in place. Will cont to monitor

## 2025-05-16 NOTE — HPI
New Petit is a 59 y.o. male with Asthma and HTN who presented to [facility] [place] ED for eval and treatment of severe headache and dizziness. The patient states he had a headache last night so he checked his BP. His blood pressure was high so he took an extra dose of his blood pressure medication. He went to bed around 8:00 p.m. He woke around 7:00 a.m. with a headache and immediately noted the room was spinning. He states his headache is severe and his head feels larger than his body.  The pain radiates into his neck.  He was unable to go to work due to the HA and dizziness.  No trouble talking but reports he is unsteady when he walks.   No notable recent healthcare encounters.  Reports his home SBP usually runs around 180, and that he is taking all home medications as prescribed: chart review of these meds indicates he has only been filling amlodipine 5 daily.    ED course: BP is 227/109.  Nicardipine drip ordered for hypertensive emergency.  After tele neurology evaluation, CTA was advised at this time.  No TNK.  CTA with no LVO or aneurysm.  BP improved with cardene drip.  HA and dizziness had almost resolved on initial HM eval after his arrival to the ICU.   Labs with mildly elevated potassium. Normal troponin and creatinine.  They were admitted to [facility] ICU under the care of Hospital Medicine for further evaluation and treatment.

## 2025-05-16 NOTE — ED PROVIDER NOTES
Encounter Date: 5/16/2025       History     Chief Complaint   Patient presents with    Headache     Severe headache, dizziness, and neck pain onset 0715.      59-year-old male with hypertension presents with with severe headache and dizziness.  The patient states he had a headache last night so he checked his BP. His blood pressure was high so he took an extra dose of his blood pressure medication.  He went to bed around 8:00 p.m. He woke around 7:00 a.m. with a headache and immediately noted the room was spinning.  He states his headache is severe and his head feels larger than his body.  The pain radiates into his neck. He was unable to go to work due to the HA and dizziness. No trouble talking but reports he is unsteady when he walks.       Review of patient's allergies indicates:  No Known Allergies  Past Medical History:   Diagnosis Date    Hypertension      History reviewed. No pertinent surgical history.  No family history on file.  Social History[1]  Review of Systems   Constitutional:  Negative for activity change, appetite change, chills and fever.   HENT:  Negative for congestion, rhinorrhea, sneezing and sore throat.    Eyes:  Negative for visual disturbance.   Respiratory:  Negative for cough, chest tightness, shortness of breath and wheezing.    Cardiovascular:  Negative for chest pain and palpitations.   Gastrointestinal:  Negative for abdominal pain, diarrhea, nausea and vomiting.   Musculoskeletal:  Positive for neck pain.   Skin:  Negative for rash.   Neurological:  Positive for dizziness and headaches. Negative for light-headedness.   All other systems reviewed and are negative.      Physical Exam     Initial Vitals [05/16/25 1124]   BP Pulse Resp Temp SpO2   (!) 182/101 64 18 -- 98 %      MAP       --         Physical Exam    Nursing note and vitals reviewed.  Constitutional: He appears well-developed and well-nourished. He appears distressed.   HENT:   Head: Normocephalic and atraumatic.   Eyes:  Conjunctivae and EOM are normal. Pupils are equal, round, and reactive to light.   Neck:   Normal range of motion.  Cardiovascular:  Normal rate and regular rhythm.           No murmur heard.  Pulmonary/Chest: Breath sounds normal. No respiratory distress.   Abdominal: Bowel sounds are normal. He exhibits no distension. There is no abdominal tenderness.   Musculoskeletal:         General: No tenderness or edema. Normal range of motion.      Cervical back: Normal range of motion.     Neurological: He is alert and oriented to person, place, and time. GCS score is 15. GCS eye subscore is 4. GCS verbal subscore is 5. GCS motor subscore is 6.   Skin: Skin is warm and dry. No rash noted.   Psychiatric: He has a normal mood and affect. His behavior is normal.       Current NIH Stroke Score Values      Flowsheet Row Most Recent Value   Interval baseline   1a. Level of Consciousness 0-->Alert, keenly responsive   1b. LOC Questions 0-->Answers both questions correctly   1c. LOC Commands 0-->Performs both tasks correctly   2. Best Gaze 0-->Normal   3. Visual 0-->No visual loss   4. Facial Palsy 0-->Normal symmetrical movements   5a. Motor Arm, Left 0-->No drift, limb holds 90 (or 45) degrees for full 10 secs   5b. Motor Arm, Right 0-->No drift, limb holds 90 (or 45) degrees for full 10 secs   6a. Motor Leg, Left 0-->No drift, leg holds 30 degree position for full 5 secs   6b. Motor Leg, Right 0-->No drift, leg holds 30 degree position for full 5 secs   7. Limb Ataxia 0-->Absent   8. Sensory 0-->Normal, no sensory loss   9. Best Language 0-->No aphasia, normal   10. Dysarthria 0-->Normal   11. Extinction and Inattention (formerly Neglect) 0-->No abnormality   Total (NIH Stroke Scale) 0             ED Course   Procedures  Labs Reviewed   POCT GLUCOSE, HAND-HELD DEVICE - Abnormal       Result Value    POC Glucose 111 (*)    POCT GLUCOSE - Abnormal    POCT Glucose 111 (*)    POCT CMP - Abnormal    Albumin, POC 3.8      Alkaline  Phosphatase, POC 71      ALT (SGPT), POC 23      AST (SGOT), POC 26      POC BUN 15      Calcium, POC 9.1      POC Chloride 111 (*)     POC Creatinine 1.3 (*)     POC Glucose 111      POC Potassium 5.5 (*)     POC Sodium 142      Bilirubin, POC 1.0      POC TCO2 30      Protein, POC 8.0     ISTAT PROCEDURE - Abnormal    POC PH 7.345 (*)     POC PCO2 50.0 (*)     POC PO2 34 (*)     POC HCO3 27.3      POC BE 1      POC SATURATED O2 62      POC Lactate 0.62      POC TCO2 29      Sample VENOUS      Site Other      Allens Test N/A     POCT CMP - Abnormal    Albumin, POC 3.7      Alkaline Phosphatase, POC 70      ALT (SGPT), POC 19      AST (SGOT), POC 26      POC BUN 14      Calcium, POC 10.0      POC Chloride 106      POC Creatinine 1.0      POC Glucose 104      POC Potassium 5.4 (*)     POC Sodium 142      Bilirubin, POC 0.7      POC TCO2 29      Protein, POC 7.3     TROPONIN ISTAT    POC Cardiac Troponin I 0.00      Sample unknown     POCT CBC    Hematocrit        Hemoglobin        RBC        WBC        MCV        MCH, POC        MCHC        RDW-CV        Platelet Count, POC        MPV       POCT CMP   POCT PROTIME-INR   ISTAT PROCEDURE    POC PTWBT 9.9      POC PTINR 1.0      Sample unknown     POCT TROPONIN   POCT CMP     EKG Readings: (Independently Interpreted)   Initial Reading: No STEMI. Rhythm: Sinus Bradycardia. Heart Rate: 59. Ectopy: No Ectopy. Conduction: Normal. ST Segments: Normal ST Segments. T Waves: Normal. Clinical Impression: Sinus Bradycardia Other Impression: Independently interpreted by me       Imaging Results              CTA Head and Neck (xpd) (Final result)  Result time 05/16/25 13:32:55      Final result by Cayetano Adams MD (05/16/25 13:32:55)                   Impression:      No major branch advanced stenosis/occlusion intracranially at the ciqtxl-[id number]-Uqvepi.  No evidence of aneurysm.  The venous sinuses are patent.    No significant stenosis at the carotid bifurcations by NASCET  criteria.    No significant stenosis of the dominant right vertebral artery with likely prominent stenosis at the origin of the non dominant left vertebral artery that may terminate as the PICA.  The basilar artery is patent.    There is diffuse sclerosis throughout the osseous elements of the cervical spine of unclear etiology but may be metabolic in nature unless otherwise suspected clinically for clinical correlation.      Electronically signed by: Cayetano Adams  Date:    05/16/2025  Time:    13:32               Narrative:    EXAMINATION:  CTA HEAD AND NECK (XPD)    CLINICAL HISTORY:  Dizziness, persistent/recurrent, cardiac or vascular cause suspected; headache.    TECHNIQUE:  Non contrast low dose axial images were obtained through the head.  CT angiogram was performed from the level of the mango to the top of the head following the IV administration of 85mL of Omnipaque 350.   Sagittal and coronal reconstructions and maximum intensity projection reconstructions were performed. Arterial stenosis percentages are based on NASCET measurement criteria.    3D reformats were created on an independent workstation to evaluate the oehkjz-am-Prhetr.    COMPARISON:  Head CT 05/16/2025    FINDINGS:  No enhancing intracranial lesion.    CTA:    No advanced stenosis at the origins of the vessels from the aortic arch.    No significant stenosis at the origin of the right vertebral artery which is the dominant vessel..  The left vertebral artery originates directly from the aortic arch, with underlying stenosis suggested, and enters the foramen transversarium at the C5 level and may terminate as the PICA.    No significant stenosis at the carotid bifurcations by NASCET criteria.    Intracranially there is no major branch advanced stenosis/occlusion.  Developmentally the posterior communicating arteries are prominent bilaterally.    No aneurysm. The venous sinuses are patent.    No soft tissue mass in the neck.    No acute  cervical fracture however there is diffuse sclerosis of the osseous structures of unclear etiology but may be metabolic in nature if clinically consistent.                                       CT Head Without Contrast (Final result)  Result time 05/16/25 11:42:21      Final result by Mio Cantu MD (05/16/25 11:42:21)                   Impression:      No acute intracranial findings.      Electronically signed by: Mio Cantu  Date:    05/16/2025  Time:    11:42               Narrative:    EXAMINATION:  CT HEAD WITHOUT CONTRAST    CLINICAL HISTORY:  Neuro deficit, acute, stroke suspected;    TECHNIQUE:  Low dose axial images were obtained through the head.  Coronal and sagittal reformations were also performed. Contrast was not administered.    COMPARISON:  None.    FINDINGS:  Blood: No acute intracranial hemorrhage.    Parenchyma: No definite loss of gray-white differentiation to suggest acute or subacute transcortical infarct.    Ventricles/Extra-axial spaces: No abnormal extra-axial fluid collection. Basal cisterns are patent.    Vessels: Mild atherosclerotic calcification.    Orbits: Unremarkable.    Scalp: Unremarkable.    Skull: There are no depressed skull fractures or destructive bone lesions.    Sinuses and mastoids: Relatively minimal paranasal sinus mucosal thickening.    Other findings: None                                       Medications   niCARdipine 40 mg/200 mL (0.2 mg/mL) infusion (5 mg/hr Intravenous New Bag 5/16/25 1147)   iohexoL (OMNIPAQUE 350) injection 85 mL (85 mLs Intravenous Given 5/16/25 1225)   meclizine tablet 25 mg (25 mg Oral Given 5/16/25 1232)     Medical Decision Making  BP is 227/109.  Nicardipine drip ordered for hypertensive emergency.  After tele neurology evaluation, CTA was advised at this time.  No TNK.  CTA with no LVO or aneurysm.     BP improved with cardene drip. HA and dizziness resolved. Labs with mildly elevated potassium. Normal troponin and creatinine.  Plan to admit for MRI and further evaluation. Admission was discussed with Dr. Ibarra.     Amount and/or Complexity of Data Reviewed  Labs: ordered.  Radiology: ordered.  Discussion of management or test interpretation with external provider(s): Admission was discussed with Dr. Ibarra    Risk  Drug therapy requiring intensive monitoring for toxicity.  Decision regarding hospitalization.    Critical Care  Total time providing critical care: 48 minutes                                      Clinical Impression:  Final diagnoses:  [R29.818] Acute focal neurological deficit  [I16.1] Hypertensive emergency (Primary)  [R51.9] Nonintractable headache, unspecified chronicity pattern, unspecified headache type  [R42] Dizziness          ED Disposition Condition    Admit                     [1]   Social History  Tobacco Use    Smoking status: Every Day     Current packs/day: 0.25     Types: Cigarettes    Smokeless tobacco: Never        Katty Jeong MD  05/16/25 1532

## 2025-05-16 NOTE — SUBJECTIVE & OBJECTIVE
Past Medical History:   Diagnosis Date    Hypertension        History reviewed. No pertinent surgical history.    Review of patient's allergies indicates:  No Known Allergies    No current facility-administered medications on file prior to encounter.     Current Outpatient Medications on File Prior to Encounter   Medication Sig    amLODIPine (NORVASC) 10 MG tablet     diclofenac sodium (VOLTAREN) 1 % Gel Apply 2 g topically 4 (four) times daily as needed (Apply to painful area 4 times a day as needed for pain).    acetaminophen (TYLENOL) 500 MG tablet Take 1 tablet (500 mg total) by mouth every 6 (six) hours as needed for Pain (As needed for mild-to-moderate pain).    albuterol (PROVENTIL/VENTOLIN HFA) 90 mcg/actuation inhaler INHALE 1 PUFF INTO THE LUNGS EVERY 6 HOURS AS NEEDED FOR SHORTNESS OF BREATH    ANORO ELLIPTA 62.5-25 mcg/actuation DsDv Inhale 1 puff into the lungs once daily.    ibuprofen (ADVIL,MOTRIN) 600 MG tablet Take 1 tablet (600 mg total) by mouth every 6 (six) hours as needed for Pain (Take with food as needed for mild-to-moderate pain).    LIDOcaine (LIDODERM) 5 % SMARTSIG:Topical    loratadine (CLARITIN) 10 mg tablet Take 1 tablet (10 mg total) by mouth once daily.    losartan (COZAAR) 50 MG tablet Take 50 mg by mouth once daily.    pantoprazole (PROTONIX) 40 MG tablet Take 40 mg by mouth once daily.     Family History    None       Tobacco Use    Smoking status: Every Day     Current packs/day: 0.25     Types: Cigarettes    Smokeless tobacco: Never   Substance and Sexual Activity    Alcohol use: Not on file    Drug use: Not on file    Sexual activity: Not on file     Review of Systems   Reason unable to perform ROS: ROS was performed and pertinent +s and -s are listed in HPI.     Objective:     Vital Signs (Most Recent):  Temp: 97.7 °F (36.5 °C) (05/16/25 1640)  Pulse: (!) 53 (05/16/25 1800)  Resp: (!) 23 (05/16/25 1800)  BP: (!) 189/88 (05/16/25 1800)  SpO2: 98 % (05/16/25 1800) Vital Signs  "(24h Range):  Temp:  [97.7 °F (36.5 °C)] 97.7 °F (36.5 °C)  Pulse:  [50-65] 53  Resp:  [16-24] 23  SpO2:  [95 %-99 %] 98 %  BP: (131-227)/() 189/88     Weight: 102.2 kg (225 lb 5 oz)  Body mass index is 33.27 kg/m².     Physical Exam  Constitutional:       General: He is not in acute distress.     Appearance: Normal appearance. He is not ill-appearing.   HENT:      Head: Normocephalic.   Neck:      Comments: JVP not elevated  Cardiovascular:      Rate and Rhythm: Normal rate and regular rhythm.      Pulses: Normal pulses.      Heart sounds: Normal heart sounds.   Pulmonary:      Effort: Pulmonary effort is normal.      Breath sounds: Normal breath sounds.   Abdominal:      General: Abdomen is flat. Bowel sounds are normal.      Tenderness: There is no abdominal tenderness. There is no guarding.   Musculoskeletal:      Right lower leg: No edema.      Left lower leg: No edema.   Skin:     General: Skin is warm and dry.      Capillary Refill: Capillary refill takes less than 2 seconds.      Coloration: Skin is not jaundiced.   Neurological:      General: No focal deficit present.      Mental Status: He is alert and oriented to person, place, and time.   Psychiatric:         Mood and Affect: Mood normal.         Behavior: Behavior normal.                Significant Labs: All pertinent labs within the past 24 hours have been reviewed.  CBC: No results for input(s): "WBC", "HGB", "HCT", "PLT" in the last 48 hours.  CMP: No results for input(s): "NA", "K", "CL", "CO2", "GLU", "BUN", "CREATININE", "CALCIUM", "PROT", "ALBUMIN", "BILITOT", "ALKPHOS", "AST", "ALT", "ANIONGAP", "EGFRNONAA" in the last 48 hours.    Invalid input(s): "ESTGFAFRICA"  Cardiac Markers: No results for input(s): "CKMB", "MYOGLOBIN", "BNP", "TROPISTAT" in the last 48 hours.  Magnesium: No results for input(s): "MG" in the last 48 hours.  Troponin: No results for input(s): "TROPONINI", "TROPONINIHS" in the last 48 hours.    Significant Imaging: I " have reviewed all pertinent imaging results/findings within the past 24 hours.    Results for orders placed or performed during the hospital encounter of 05/16/25 (from the past 2160 hours)   CTA Head and Neck (xpd)    Narrative    EXAMINATION:  CTA HEAD AND NECK (XPD)    CLINICAL HISTORY:  Dizziness, persistent/recurrent, cardiac or vascular cause suspected; headache.    TECHNIQUE:  Non contrast low dose axial images were obtained through the head.  CT angiogram was performed from the level of the mango to the top of the head following the IV administration of 85mL of Omnipaque 350.   Sagittal and coronal reconstructions and maximum intensity projection reconstructions were performed. Arterial stenosis percentages are based on NASCET measurement criteria.    3D reformats were created on an independent workstation to evaluate the ndmrax-sa-Huxupt.    COMPARISON:  Head CT 05/16/2025    FINDINGS:  No enhancing intracranial lesion.    CTA:    No advanced stenosis at the origins of the vessels from the aortic arch.    No significant stenosis at the origin of the right vertebral artery which is the dominant vessel..  The left vertebral artery originates directly from the aortic arch, with underlying stenosis suggested, and enters the foramen transversarium at the C5 level and may terminate as the PICA.    No significant stenosis at the carotid bifurcations by NASCET criteria.    Intracranially there is no major branch advanced stenosis/occlusion.  Developmentally the posterior communicating arteries are prominent bilaterally.    No aneurysm. The venous sinuses are patent.    No soft tissue mass in the neck.    No acute cervical fracture however there is diffuse sclerosis of the osseous structures of unclear etiology but may be metabolic in nature if clinically consistent.      Impression    No major branch advanced stenosis/occlusion intracranially at the shdwus-sm-Lbkoux.  No evidence of aneurysm.  The venous sinuses  are patent.    No significant stenosis at the carotid bifurcations by NASCET criteria.    No significant stenosis of the dominant right vertebral artery with likely prominent stenosis at the origin of the non dominant left vertebral artery that may terminate as the PICA.  The basilar artery is patent.    There is diffuse sclerosis throughout the osseous elements of the cervical spine of unclear etiology but may be metabolic in nature unless otherwise suspected clinically for clinical correlation.      Electronically signed by: Cayetano Adams  Date:    05/16/2025  Time:    13:32   CT Head Without Contrast    Narrative    EXAMINATION:  CT HEAD WITHOUT CONTRAST    CLINICAL HISTORY:  Neuro deficit, acute, stroke suspected;    TECHNIQUE:  Low dose axial images were obtained through the head.  Coronal and sagittal reformations were also performed. Contrast was not administered.    COMPARISON:  None.    FINDINGS:  Blood: No acute intracranial hemorrhage.    Parenchyma: No definite loss of gray-white differentiation to suggest acute or subacute transcortical infarct.    Ventricles/Extra-axial spaces: No abnormal extra-axial fluid collection. Basal cisterns are patent.    Vessels: Mild atherosclerotic calcification.    Orbits: Unremarkable.    Scalp: Unremarkable.    Skull: There are no depressed skull fractures or destructive bone lesions.    Sinuses and mastoids: Relatively minimal paranasal sinus mucosal thickening.    Other findings: None      Impression    No acute intracranial findings.      Electronically signed by: Mio Cantu  Date:    05/16/2025  Time:    11:42

## 2025-05-16 NOTE — TELEMEDICINE CONSULT
[facility]  Vascular Neurology  Comprehensive Stroke Center  TeleVascular Neurology Acute Consultation Note        Consult Information  Consults    Consulting Provider: PAGE SHERWOOD   Current Providers  No providers found    Patient Location:  [facility] EMERGENCY DEPARTMENT Emergency Department    [facility] hospital nurse at bedside with patient assisting consultant.  Patient information was obtained from patient, past medical records, and primary team.       Stroke Documentation  Acute Stroke Times   Last Known Normal Date: 05/15/25  Last Known Normal Time: 2000  Symptom Onset Date: 05/15/25  Symptom Onset Time: 2000  Stroke Team Called Date: 05/16/25  Stroke Team Called Time: 1028  Stroke Team Arrival Date: 05/16/25  Stroke Team Arrival Time: 1031  CT Interpretation Time: 1031  Thrombolytic Therapy Recommended: No  Thrombectomy Recommended: No    NIH Scale:  Interval: baseline  1a. Level of Consciousness: 0-->Alert, keenly responsive  1b. LOC Questions: 0-->Answers both questions correctly  1c. LOC Commands: 0-->Performs both tasks correctly  2. Best Gaze: 0-->Normal  3. Visual: 0-->No visual loss  4. Facial Palsy: 0-->Normal symmetrical movements  5a. Motor Arm, Left: 0-->No drift, limb holds 90 (or 45) degrees for full 10 secs  5b. Motor Arm, Right: 0-->No drift, limb holds 90 (or 45) degrees for full 10 secs  6a. Motor Leg, Left: 0-->No drift, leg holds 30 degree position for full 5 secs  6b. Motor Leg, Right: 0-->No drift, leg holds 30 degree position for full 5 secs  7. Limb Ataxia: 0-->Absent  8. Sensory: 0-->Normal, no sensory loss  9. Best Language: 0-->No aphasia, normal  10. Dysarthria: 0-->Normal  11. Extinction and Inattention (formerly Neglect): 0-->No abnormality  Total (NIH Stroke Scale): 0      Modified Hudson Baseline: Score: 0  Modified Hudson Discharge:    Erum Coma Scale: 15   ABCD2 Score:    [id number] Score:    HAS -BLED Score:    ICH Score:    Hunt & Child Classification:       Blood pressure (!) 227/109, pulse 64, resp. rate 18, SpO2 99%.      In my opinion, this was a: Tier 2; VAN Stroke Assessment: Negative     Medical Decision Making  HPI:  59 y.o. male with HTN, obesity, presents with acute onset HA, vertigo, vomiting, imbalance that began last night and worsened upon awakening. Never had similar symptoms before.     Images personally reviewed and interpreted:  Study: Head CT  Study Interpretation: no acute abnormality     Assessment and plan:  D Dx hypertensive emergency vs acute posterior circulation infarct.  No TNK candidate.  CTA brain/neck, MRI recommended.   Agree with 's.    Lytics recommendation: Thrombolytic therapy not recommended due to Outside of treatment window     Thrombectomy recommendation: Awaiting CTA results from [facility] for determination   Placement recommendation: pending further studies               ROS  Physical Exam  Past Medical History:   Diagnosis Date    Hypertension      History reviewed. No pertinent surgical history.  No family history on file.    Diagnoses  Thrombotic (small vessel): Cerebellar Artery Right    Tae Zarate MD      Emergent/Acute neurological consultation requested by spoke provider due to critical concerns for possible cerebrovascular event that could result in permanent loss of neurologic/bodily function, severe disability or death of this patient.  Immediate/timely evaluation by a highly prepared expert is paramount for optimal outcomes  High risk for neurological deterioration if not properly diagnosed  High risk for neurological deterioration if not treated promplty/as soon as possible  Complex diagnostic evaluation may be required (advanced imaging)  High risk treatment options (thrombolytics and/or thrombectomy)    Patient care was coordinated with spoke provider, including but not limted to    Discussing likely diagnosis/etiology of symptoms  Making recommendations for further diagnostic studies  Making  recommendations for intravenous thrombolytics or other advanced therapies  Making recommendations for disposition (admission/transfer for higher level of care)      Neurology consultation requested by spoke provider. Audiovisual encounter with the patient performed using a secure connection.  Results and impressions from the visit are documented on this note and were communicated to the consulting provider/team via direct communication. The note has been shared for addition to the patients electronic medical record.

## 2025-05-16 NOTE — ED NOTES
To hold Cardene at this time per MD order. To keep MAP at approx 115 per Dr. Jeong. Pt to be admitted. Waiting admit orders and bed assignment. Will cont to monitor

## 2025-05-16 NOTE — SUBJECTIVE & OBJECTIVE
HPI:  59 y.o. male with HTN, obesity, presents with acute onset HA, vertigo, vomiting, imbalance that began last night and worsened upon awakening. Never had similar symptoms before.     Images personally reviewed and interpreted:  Study: Head CT  Study Interpretation: no acute abnormality     Assessment and plan:  D Dx hypertensive emergency vs acute posterior circulation infarct.  No TNK candidate.  CTA brain/neck, MRI recommended.   Agree with 's.    Lytics recommendation: Thrombolytic therapy not recommended due to Outside of treatment window     Thrombectomy recommendation: Awaiting CTA results from [facility] for determination   Placement recommendation: pending further studies

## 2025-05-16 NOTE — ED NOTES
Patient evaluated by Dr. Jeong. Patient is in CT scan. Blood glucose completed. Vital signs. RA. Transfer center called.

## 2025-05-17 VITALS
WEIGHT: 225.31 LBS | DIASTOLIC BLOOD PRESSURE: 74 MMHG | BODY MASS INDEX: 33.37 KG/M2 | TEMPERATURE: 98 F | RESPIRATION RATE: 28 BRPM | HEIGHT: 69 IN | OXYGEN SATURATION: 96 % | HEART RATE: 55 BPM | SYSTOLIC BLOOD PRESSURE: 160 MMHG

## 2025-05-17 LAB
ABSOLUTE EOSINOPHIL (OHS): 0.14 K/UL
ABSOLUTE MONOCYTE (OHS): 0.79 K/UL (ref 0.3–1)
ABSOLUTE NEUTROPHIL COUNT (OHS): 4.95 K/UL (ref 1.8–7.7)
ALBUMIN SERPL BCP-MCNC: 3.4 G/DL (ref 3.5–5.2)
ALP SERPL-CCNC: 72 UNIT/L (ref 40–150)
ALT SERPL W/O P-5'-P-CCNC: 13 UNIT/L (ref 10–44)
ANION GAP (OHS): 9 MMOL/L (ref 8–16)
AST SERPL-CCNC: 13 UNIT/L (ref 11–45)
BASOPHILS # BLD AUTO: 0.02 K/UL
BASOPHILS NFR BLD AUTO: 0.2 %
BILIRUB SERPL-MCNC: 0.7 MG/DL (ref 0.1–1)
BUN SERPL-MCNC: 21 MG/DL (ref 8–23)
CALCIUM SERPL-MCNC: 8.7 MG/DL (ref 8.7–10.5)
CHLORIDE SERPL-SCNC: 106 MMOL/L (ref 95–110)
CO2 SERPL-SCNC: 22 MMOL/L (ref 23–29)
CREAT SERPL-MCNC: 1.1 MG/DL (ref 0.5–1.4)
ERYTHROCYTE [DISTWIDTH] IN BLOOD BY AUTOMATED COUNT: 13.4 % (ref 11.5–14.5)
GFR SERPLBLD CREATININE-BSD FMLA CKD-EPI: >60 ML/MIN/1.73/M2
GLUCOSE SERPL-MCNC: 96 MG/DL (ref 70–110)
HCT VFR BLD AUTO: 41.3 % (ref 40–54)
HGB BLD-MCNC: 13.9 GM/DL (ref 14–18)
IMM GRANULOCYTES # BLD AUTO: 0.03 K/UL (ref 0–0.04)
IMM GRANULOCYTES NFR BLD AUTO: 0.4 % (ref 0–0.5)
LYMPHOCYTES # BLD AUTO: 2.44 K/UL (ref 1–4.8)
MAGNESIUM SERPL-MCNC: 2.1 MG/DL (ref 1.6–2.6)
MCH RBC QN AUTO: 26.8 PG (ref 27–31)
MCHC RBC AUTO-ENTMCNC: 33.7 G/DL (ref 32–36)
MCV RBC AUTO: 80 FL (ref 82–98)
NUCLEATED RBC (/100WBC) (OHS): 0 /100 WBC
PHOSPHATE SERPL-MCNC: 3.1 MG/DL (ref 2.7–4.5)
PLATELET # BLD AUTO: 176 K/UL (ref 150–450)
PMV BLD AUTO: 10.7 FL (ref 9.2–12.9)
POTASSIUM SERPL-SCNC: 4 MMOL/L (ref 3.5–5.1)
PROT SERPL-MCNC: 7.3 GM/DL (ref 6–8.4)
RBC # BLD AUTO: 5.18 M/UL (ref 4.6–6.2)
RELATIVE EOSINOPHIL (OHS): 1.7 %
RELATIVE LYMPHOCYTE (OHS): 29.2 % (ref 18–48)
RELATIVE MONOCYTE (OHS): 9.4 % (ref 4–15)
RELATIVE NEUTROPHIL (OHS): 59.1 % (ref 38–73)
SODIUM SERPL-SCNC: 137 MMOL/L (ref 136–145)
WBC # BLD AUTO: 8.37 K/UL (ref 3.9–12.7)

## 2025-05-17 PROCEDURE — G0378 HOSPITAL OBSERVATION PER HR: HCPCS

## 2025-05-17 PROCEDURE — 36415 COLL VENOUS BLD VENIPUNCTURE: CPT

## 2025-05-17 PROCEDURE — 83735 ASSAY OF MAGNESIUM: CPT

## 2025-05-17 PROCEDURE — 99900035 HC TECH TIME PER 15 MIN (STAT)

## 2025-05-17 PROCEDURE — 80053 COMPREHEN METABOLIC PANEL: CPT

## 2025-05-17 PROCEDURE — 84100 ASSAY OF PHOSPHORUS: CPT

## 2025-05-17 PROCEDURE — 25000003 PHARM REV CODE 250

## 2025-05-17 PROCEDURE — 85025 COMPLETE CBC W/AUTO DIFF WBC: CPT

## 2025-05-17 PROCEDURE — 94761 N-INVAS EAR/PLS OXIMETRY MLT: CPT

## 2025-05-17 PROCEDURE — 25000003 PHARM REV CODE 250: Performed by: HOSPITALIST

## 2025-05-17 RX ORDER — AMLODIPINE BESYLATE 10 MG/1
10 TABLET ORAL DAILY
Qty: 30 TABLET | Refills: 2 | Status: SHIPPED | OUTPATIENT
Start: 2025-05-17 | End: 2025-06-16

## 2025-05-17 RX ORDER — HYDRALAZINE HYDROCHLORIDE 20 MG/ML
20 INJECTION INTRAMUSCULAR; INTRAVENOUS EVERY 4 HOURS PRN
Status: DISCONTINUED | OUTPATIENT
Start: 2025-05-17 | End: 2025-05-17 | Stop reason: HOSPADM

## 2025-05-17 RX ORDER — LOSARTAN POTASSIUM 25 MG/1
50 TABLET ORAL ONCE
Status: COMPLETED | OUTPATIENT
Start: 2025-05-17 | End: 2025-05-17

## 2025-05-17 RX ORDER — MUPIROCIN 20 MG/G
OINTMENT TOPICAL 2 TIMES DAILY
Status: DISCONTINUED | OUTPATIENT
Start: 2025-05-17 | End: 2025-05-17 | Stop reason: HOSPADM

## 2025-05-17 RX ORDER — HYDROCHLOROTHIAZIDE 25 MG/1
25 TABLET ORAL DAILY
Status: DISCONTINUED | OUTPATIENT
Start: 2025-05-17 | End: 2025-05-17 | Stop reason: HOSPADM

## 2025-05-17 RX ORDER — LOSARTAN POTASSIUM 100 MG/1
100 TABLET ORAL DAILY
Qty: 30 TABLET | Refills: 2 | Status: SHIPPED | OUTPATIENT
Start: 2025-05-17 | End: 2025-06-16

## 2025-05-17 RX ORDER — HYDRALAZINE HYDROCHLORIDE 20 MG/ML
10 INJECTION INTRAMUSCULAR; INTRAVENOUS EVERY 4 HOURS PRN
Status: DISCONTINUED | OUTPATIENT
Start: 2025-05-17 | End: 2025-05-17

## 2025-05-17 RX ADMIN — LOSARTAN POTASSIUM 50 MG: 25 TABLET, FILM COATED ORAL at 08:05

## 2025-05-17 RX ADMIN — MUPIROCIN: 20 OINTMENT TOPICAL at 08:05

## 2025-05-17 RX ADMIN — LOSARTAN POTASSIUM 50 MG: 25 TABLET, FILM COATED ORAL at 03:05

## 2025-05-17 RX ADMIN — AMLODIPINE BESYLATE 10 MG: 5 TABLET ORAL at 08:05

## 2025-05-17 RX ADMIN — PANTOPRAZOLE SODIUM 40 MG: 40 TABLET, DELAYED RELEASE ORAL at 08:05

## 2025-05-17 RX ADMIN — HYDROCHLOROTHIAZIDE 25 MG: 25 TABLET ORAL at 09:05

## 2025-05-17 NOTE — EICU
Intervention Initiated From:  COR / KAVINU    Dave intervened regarding:  Rounding (Video assessment)      VICU Night Rounds Checklist  24H Vital Sign Range:  Temp:  [97.7 °F (36.5 °C)-98 °F (36.7 °C)]   Pulse:  [50-65]   Resp:  [16-30]   BP: (131-227)/()   SpO2:  [95 %-100 %]     Video rounds and LDA reconciliation

## 2025-05-17 NOTE — PLAN OF CARE
Problem: Adult Inpatient Plan of Care  Goal: Optimal Comfort and Wellbeing  Outcome: Progressing     Problem: Hypertension Acute  Goal: Blood Pressure Within Desired Range  Outcome: Progressing

## 2025-05-17 NOTE — HOSPITAL COURSE
New Petit is a 59 y.o. male with Asthma and HTN who presented to [facility] [place] ED for eval and treatment of severe headache and dizziness. The patient states he had a headache last night so he checked his BP. His blood pressure was high so he took an extra dose of his blood pressure medication.    ED course: BP is 227/109.  Nicardipine drip ordered for hypertensive emergency.  After tele neurology evaluation, CTA was advised at this time.  No TNK.  CTA with no LVO or aneurysm.  BP improved with cardene drip.  HA and dizziness had almost resolved on initial HM eval after his arrival to the ICU.   Labs with mildly elevated potassium. Normal troponin and creatinine.  They were admitted to [facility] ICU fort Cardene drip for hypertensive urgency,his blood pressure gradually improved,headache is resolved,was stare td on new BP med;'s,patient was discharged home with new BP med's and follow up with PCP as out patient.

## 2025-05-17 NOTE — PLAN OF CARE
Case Management Final Discharge Note    Discharge Disposition: Home    New DME ordered / company name: None    Relevant SDOH / Transition of Care Barriers:  None    Person available to provide assistance at home when needed and their contact information: Sarah Tapia [phone]    Scheduled followup appointment: Pt to call PCP within one week to schedule a hospital follow up.    Referrals placed: None    Transportation: Private vehicle    Patient and family educated on discharge services and updated on DC plan. Bedside RN Jessica notified, patient clear to discharge from Case Management Perspective.    05/17/25 1131   Final Note   Assessment Type Final Discharge Note   Anticipated Discharge Disposition Home   What phone number can be called within the next 1-3 days to see how you are doing after discharge? [phone]   Hospital Resources/Appts/Education Provided Appointments scheduled and added to AVS   Post-Acute Status   Discharge Delays None known at this time

## 2025-05-17 NOTE — PLAN OF CARE
Case Management Assessment     PCP: David Shirley MD    Pharmacy:   [id number] - [place] - [address] AT [facility] [address]  [address]  [place] [address]  Phone: [phone] Fax: [phone]     Patient Arrived From: Home  Existing Help at Home: Brother    Barriers to Discharge: None    Discharge Plan:    A. Home   B. Home with family    CM met with pt at [facility] to discuss discharge planning. Pt resides with his brother and uses a cane at home for mobility. Pt is independent and his girlfriend Sarah will provide transportation at discharge. CM will continue to follow-up for discharge needs.   05/17/25 1125   Discharge Assessment   Assessment Type Discharge Planning Assessment   Confirmed/corrected address, phone number and insurance Yes   Confirmed Demographics Correct on Facesheet   Source of Information patient   Communicated TROY with patient/caregiver Yes   Reason For Admission Hypertensive emergency   People in Home sibling(s)   Facility Arrived From: Home   Do you expect to return to your current living situation? Yes   Do you have help at home or someone to help you manage your care at home? Yes   Who are your caregiver(s) and their phone number(s)? Brother   Current cognitive status: Alert/Oriented   Walking or Climbing Stairs Difficulty yes   Walking or Climbing Stairs ambulation difficulty, requires equipment   Mobility Management Cane   Dressing/Bathing Difficulty no   Equipment Currently Used at Home cane, straight   Readmission within 30 days? No   Patient currently being followed by outpatient case management? No   Do you currently have service(s) that help you manage your care at home? No   Do you take prescription medications? Yes   Do you have prescription coverage? Yes   Do you have any problems affording any of your prescribed medications? No   Is the patient taking medications as prescribed? yes   Who is going to help you get home at discharge?  What Type Of Note Output Would You Prefer (Optional)?: Bullet Format Hpi Title: Evaluation of Skin Lesions Sarah- girlfriend   How do you get to doctors appointments? car, drives self;family or friend will provide   Are you on dialysis? No   Do you take coumadin? No   Discharge Plan A Home   Discharge Plan B Home with family   DME Needed Upon Discharge  none   Discharge Plan discussed with: Patient   Transition of Care Barriers None   Physical Activity   On average, how many days per week do you engage in moderate to strenuous exercise (like a brisk walk)? 3 days   On average, how many minutes do you engage in exercise at this level? 30 min   Financial Resource Strain   How hard is it for you to pay for the very basics like food, housing, medical care, and heating? Somewhat   Housing Stability   In the last 12 months, was there a time when you were not able to pay the mortgage or rent on time? N   At any time in the past 12 months, were you homeless or living in a shelter (including now)? N   Transportation Needs   In the past 12 months, has lack of transportation kept you from medical appointments or from getting medications? no   In the past 12 months, has lack of transportation kept you from meetings, work, or from getting things needed for daily living? No   Food Insecurity   Within the past 12 months, you worried that your food would run out before you got the money to buy more. Sometimes   Within the past 12 months, the food you bought just didn't last and you didn't have money to get more. Sometimes   Alcohol Use   Q1: How often do you have a drink containing alcohol? 2-3 per wk   Q2: How many drinks containing alcohol do you have on a typical day when you are drinking? 1 or 2        How Severe Are Your Spot(S)?: mild Have Your Spot(S) Been Treated In The Past?: has not been treated Additional History: Pt has 2 skin tags he’d like to remove, they have become irritated

## 2025-05-17 NOTE — NURSING
Pt Bp is 160/74, cleared from MD to be discharged. IV removed. Discharge teaching given to patient, verbalizes understanding.

## 2025-05-17 NOTE — ASSESSMENT & PLAN NOTE
Patient has a current diagnosis of Hypertensive emergency with end organ damage evidenced by headache and vertigo which is controlled.  Latest blood pressure and vitals reviewed-   Temp:  [97.7 °F (36.5 °C)-98.3 °F (36.8 °C)]   Pulse:  [50-62]   Resp:  [16-35]   BP: (130-197)/(9-114)   SpO2:  [95 %-100 %] .   Patient currently on IV antihypertensives.   Home meds for hypertension were reviewed and noted below.   Hypertension Medications              amLODIPine (NORVASC) 10 MG tablet     losartan (COZAAR) 50 MG tablet Take 50 mg by mouth once daily.        Medication adjustment for hospital antihypertensives is as follows- HTN is poorly controlled at home.  Only dispensed med is amlodipine 5 daily, and he says his home SBP runs in the 180s usually.  No recent PCP visits noted, yet med refills continued.  High risk of cardiovascular event if current long-term management left unchanged.    Will aim for controlled BP reduction by medications noted above, with a max daily reduction of 25%  Enacting management per 2023 AHA/ACC HTN treatment guidelines as per above.  Next step per guidelines after max ARB dose reached is to add HCTZ, and then a Beta blocker.  Will see if we can enroll him in digital hypertension program, although he is noted to never have logged in to [employer]  Monitor and mitigate end organ damage as indicated  Will ensure close PCP follow-up in place

## 2025-05-17 NOTE — EICU
Intervention Initiated From:  COR / EICU    Dave intervened regarding:  Rounding (Video assessment)    Virtual ICU Quality Rounds    Admit Date: 5/16/2025  Hospital Day: 1    ICU Day: 20h    24H Vital Sign Range:  Temp:  [97.7 °F (36.5 °C)-98.3 °F (36.8 °C)]   Pulse:  [50-65]   Resp:  [16-30]   BP: (130-227)/(9-114)   SpO2:  [95 %-100 %]     VICU Surveillance Screening                    LDA reconciliation : Yes

## 2025-05-17 NOTE — DISCHARGE SUMMARY
[facility] Medicine  Discharge Summary      Patient Name: New Petit  MRN: [id number]  [facility]: [id number]  Patient Class: IP- Inpatient  Admission Date: 5/16/2025  Hospital Length of Stay: 1 days  Discharge Date and Time: 05/17/2025 3:57 PM  Attending Physician: Swapna Sheikh, *   Discharging Provider: Swapna Sheikh MD  Primary Care Provider: David Shirley MD    Primary Care Team: [facility] ICU    HPI:   New Petit is a 59 y.o. male with Asthma and HTN who presented to [facility] [place] ED for eval and treatment of severe headache and dizziness. The patient states he had a headache last night so he checked his BP. His blood pressure was high so he took an extra dose of his blood pressure medication. He went to bed around 8:00 p.m. He woke around 7:00 a.m. with a headache and immediately noted the room was spinning. He states his headache is severe and his head feels larger than his body.  The pain radiates into his neck.  He was unable to go to work due to the HA and dizziness.  No trouble talking but reports he is unsteady when he walks.   No notable recent healthcare encounters.  Reports his home SBP usually runs around 180, and that he is taking all home medications as prescribed: chart review of these meds indicates he has only been filling amlodipine 5 daily.    ED course: BP is 227/109.  Nicardipine drip ordered for hypertensive emergency.  After tele neurology evaluation, CTA was advised at this time.  No TNK.  CTA with no LVO or aneurysm.  BP improved with cardene drip.  HA and dizziness had almost resolved on initial HM eval after his arrival to the ICU.   Labs with mildly elevated potassium. Normal troponin and creatinine.  They were admitted to [facility] ICU under the care of Hospital Medicine for further evaluation and treatment.           * No surgery found *      Hospital Course:   New Petit is a 59 y.o. male with Asthma and HTN who  presented to [facility] [place] ED for eval and treatment of severe headache and dizziness. The patient states he had a headache last night so he checked his BP. His blood pressure was high so he took an extra dose of his blood pressure medication.    ED course: BP is 227/109.  Nicardipine drip ordered for hypertensive emergency.  After tele neurology evaluation, CTA was advised at this time.  No TNK.  CTA with no LVO or aneurysm.  BP improved with cardene drip.  HA and dizziness had almost resolved on initial HM eval after his arrival to the ICU.   Labs with mildly elevated potassium. Normal troponin and creatinine.  They were admitted to [facility] ICU fort Cardene drip for hypertensive urgency,his blood pressure gradually improved,headache is resolved,was stare td on new BP med;'s,patient was discharged home with new BP med's and follow up with PCP as out patient.     Goals of Care Treatment Preferences:  Code Status: Full Code      SDOH Screening:  The patient was screened for food insecurity, housing instability, transportation needs, utility difficulties, and interpersonal safety. The social determinant(s) of health identified as a concern this admission are:  Food insecurity    Will discuss with case management and/or community health workers.    Social Drivers of Health with Concerns     Food Insecurity: Food Insecurity Present (5/17/2025)        Consults:   Consults (From admission, onward)          Status Ordering Provider     Consult to Telemedicine - Acute Stroke  Once        Provider:  (Not yet assigned)    Acknowledged PAGE SHERWOOD            Assessment & Plan  Hypertensive emergency  Patient has a current diagnosis of Hypertensive emergency with end organ damage evidenced by headache and vertigo which is controlled.  Latest blood pressure and vitals reviewed-   Temp:  [97.7 °F (36.5 °C)-98.3 °F (36.8 °C)]   Pulse:  [50-62]   Resp:  [16-35]   BP: (130-197)/(9-114)   SpO2:  [95 %-100 %] .    Patient currently on IV antihypertensives.   Home meds for hypertension were reviewed and noted below.   Hypertension Medications              amLODIPine (NORVASC) 10 MG tablet     losartan (COZAAR) 50 MG tablet Take 50 mg by mouth once daily.        Medication adjustment for hospital antihypertensives is as follows- HTN is poorly controlled at home.  Only dispensed med is amlodipine 5 daily, and he says his home SBP runs in the 180s usually.  No recent PCP visits noted, yet med refills continued.  High risk of cardiovascular event if current long-term management left unchanged.    Will aim for controlled BP reduction by medications noted above, with a max daily reduction of 25%  Enacting management per 2023 AHA/ACC HTN treatment guidelines as per above.  Next step per guidelines after max ARB dose reached is to add HCTZ, and then a Beta blocker.  Will see if we can enroll him in digital hypertension program, although he is noted to never have logged in to [facility]  Monitor and mitigate end organ damage as indicated  Will ensure close PCP follow-up in place      Vertigo  As above.    Final Active Diagnoses:    Diagnosis Date Noted POA    PRINCIPAL PROBLEM:  Hypertensive emergency [I16.1] 05/16/2025 Yes    Vertigo [R42] 05/16/2025 Yes      Problems Resolved During this Admission:       Discharged Condition: stable    Disposition: Home or Self Care    Follow Up:   Follow-up Information       David Shirley MD. Call in 1 week(s).    Specialty: Family Medicine  Why: Call PCP to schedule a hospital follow-up within one week.  Contact information:  [facility] [place] [id number] [phone]               David Shirley MD Follow up in 1 week(s).    Specialty: Family Medicine  Contact information:  [facility] [place] [id number] [phone]                           Patient Instructions:      Activity as tolerated       Significant Diagnostic Studies: Labs: BMP:   Recent Labs   Lab 05/16/25  5818  05/17/25  0354   GLU  --  96   NA  --  137   K  --  4.0   CL  --  106   CO2  --  22*   BUN  --  21   CREATININE  --  1.1   CALCIUM  --  8.7   MG 2.1 2.1   , CMP   Recent Labs   Lab 05/17/25  0354      K 4.0      CO2 22*   GLU 96   BUN 21   CREATININE 1.1   CALCIUM 8.7   PROT 7.3   ALBUMIN 3.4*   BILITOT 0.7   ALKPHOS 72   AST 13   ALT 13   ANIONGAP 9   , and CBC   Recent Labs   Lab 05/16/25  1914 05/17/25  0354   WBC 9.75 8.37   HGB 15.3 13.9*   HCT 45.9 41.3    176     Radiology: CT scan: CTA head     Pending Diagnostic Studies:       None           Medications:  Reconciled Home Medications:      Medication List        CHANGE how you take these medications      amLODIPine 10 MG tablet  Commonly known as: NORVASC  Take 1 tablet (10 mg total) by mouth once daily.  What changed:   how much to take  how to take this  when to take this     losartan 100 MG tablet  Commonly known as: COZAAR  Take 1 tablet (100 mg total) by mouth once daily.  What changed:   medication strength  how much to take            CONTINUE taking these medications      acetaminophen 500 MG tablet  Commonly known as: TYLENOL  Take 1 tablet (500 mg total) by mouth every 6 (six) hours as needed for Pain (As needed for mild-to-moderate pain).     albuterol 90 mcg/actuation inhaler  Commonly known as: PROVENTIL/VENTOLIN HFA  INHALE 1 PUFF INTO THE LUNGS EVERY 6 HOURS AS NEEDED FOR SHORTNESS OF BREATH     ANORO ELLIPTA 62.5-25 mcg/actuation Dsdv  Generic drug: umeclidinium-vilanteroL  Inhale 1 puff into the lungs once daily.     diclofenac sodium 1 % Gel  Commonly known as: VOLTAREN  Apply 2 g topically 4 (four) times daily as needed (Apply to painful area 4 times a day as needed for pain).     LIDOcaine 5 %  Commonly known as: LIDODERM  SMARTSIG:Topical     loratadine 10 mg tablet  Commonly known as: CLARITIN  Take 1 tablet (10 mg total) by mouth once daily.     pantoprazole 40 MG tablet  Commonly known as: PROTONIX  Take 40 mg by  mouth once daily.            STOP taking these medications      ibuprofen 600 MG tablet  Commonly known as: ADVIL,MOTRIN              Indwelling Lines/Drains at time of discharge:   Lines/Drains/Airways       None                   Time spent on the discharge of patient:  over 45  minutes    Critical care time spent on the evaluation and treatment of severe organ dysfunction, review of pertinent labs and imaging studies, discussions with consulting providers and discussions with patient/family: over 45  minutes.     Swapna Sheikh MD  Department of Hospital Medicine  [facility] [place] - Intensive Care

## 2025-05-17 NOTE — NURSING
[facility]  Nurses Note -- 4 Eyes      5/16/2025       Skin assessed on: Q Shift      [x] No Pressure Injuries Present    []Prevention Measures Documented    [] Yes LDA  for Pressure Injury Previously documented     [] Yes New Pressure Injury Discovered   [] LDA for New Pressure Injury Added      Attending RN:  Per Hernandez RN     Second RN:  ETHAN Augustine